# Patient Record
Sex: FEMALE | Race: WHITE | Employment: OTHER | URBAN - METROPOLITAN AREA
[De-identification: names, ages, dates, MRNs, and addresses within clinical notes are randomized per-mention and may not be internally consistent; named-entity substitution may affect disease eponyms.]

---

## 2019-09-25 ENCOUNTER — HOSPITAL ENCOUNTER (OUTPATIENT)
Age: 84
Discharge: HOME | End: 2019-09-27
Payer: MEDICARE

## 2019-09-25 ENCOUNTER — OFFICE VISIT (OUTPATIENT)
Dept: FAMILY MEDICINE CLINIC | Age: 84
End: 2019-09-25
Payer: MEDICARE

## 2019-09-25 VITALS
TEMPERATURE: 97.7 F | DIASTOLIC BLOOD PRESSURE: 64 MMHG | HEART RATE: 67 BPM | BODY MASS INDEX: 22.66 KG/M2 | RESPIRATION RATE: 16 BRPM | OXYGEN SATURATION: 98 % | WEIGHT: 141 LBS | HEIGHT: 66 IN | SYSTOLIC BLOOD PRESSURE: 130 MMHG

## 2019-09-25 DIAGNOSIS — E55.9 VITAMIN D DEFICIENCY: ICD-10-CM

## 2019-09-25 DIAGNOSIS — Z76.89 ENCOUNTER TO ESTABLISH CARE: Primary | ICD-10-CM

## 2019-09-25 DIAGNOSIS — E03.9 ACQUIRED HYPOTHYROIDISM: ICD-10-CM

## 2019-09-25 DIAGNOSIS — E11.9 TYPE 2 DIABETES MELLITUS WITHOUT COMPLICATION, WITHOUT LONG-TERM CURRENT USE OF INSULIN (HCC): ICD-10-CM

## 2019-09-25 LAB
ALBUMIN SERPL-MCNC: 4.1 G/DL (ref 3.5–5.2)
ALP BLD-CCNC: 87 U/L (ref 35–104)
ALT SERPL-CCNC: 6 U/L (ref 0–32)
ANION GAP SERPL CALCULATED.3IONS-SCNC: 20 MMOL/L (ref 7–16)
AST SERPL-CCNC: 20 U/L (ref 0–31)
BASOPHILS ABSOLUTE: 0.1 E9/L (ref 0–0.2)
BASOPHILS RELATIVE PERCENT: 1.1 % (ref 0–2)
BILIRUB SERPL-MCNC: 0.7 MG/DL (ref 0–1.2)
BUN BLDV-MCNC: 17 MG/DL (ref 8–23)
CALCIUM SERPL-MCNC: 9.4 MG/DL (ref 8.6–10.2)
CHLORIDE BLD-SCNC: 101 MMOL/L (ref 98–107)
CO2: 21 MMOL/L (ref 22–29)
CREAT SERPL-MCNC: 0.8 MG/DL (ref 0.5–1)
CREATININE URINE: 229 MG/DL (ref 29–226)
EOSINOPHILS ABSOLUTE: 0.13 E9/L (ref 0.05–0.5)
EOSINOPHILS RELATIVE PERCENT: 1.5 % (ref 0–6)
GFR AFRICAN AMERICAN: >60
GFR NON-AFRICAN AMERICAN: >60 ML/MIN/1.73
GLUCOSE BLD-MCNC: 90 MG/DL (ref 74–99)
HBA1C MFR BLD: 6.2 % (ref 4–5.6)
HCT VFR BLD CALC: 42.4 % (ref 34–48)
HEMOGLOBIN: 13.6 G/DL (ref 11.5–15.5)
IMMATURE GRANULOCYTES #: 0.02 E9/L
IMMATURE GRANULOCYTES %: 0.2 % (ref 0–5)
LYMPHOCYTES ABSOLUTE: 2.83 E9/L (ref 1.5–4)
LYMPHOCYTES RELATIVE PERCENT: 31.6 % (ref 20–42)
MCH RBC QN AUTO: 31.8 PG (ref 26–35)
MCHC RBC AUTO-ENTMCNC: 32.1 % (ref 32–34.5)
MCV RBC AUTO: 99.1 FL (ref 80–99.9)
MICROALBUMIN UR-MCNC: 88.2 MG/L
MICROALBUMIN/CREAT UR-RTO: 38.5 (ref 0–30)
MONOCYTES ABSOLUTE: 1.1 E9/L (ref 0.1–0.95)
MONOCYTES RELATIVE PERCENT: 12.3 % (ref 2–12)
NEUTROPHILS ABSOLUTE: 4.78 E9/L (ref 1.8–7.3)
NEUTROPHILS RELATIVE PERCENT: 53.3 % (ref 43–80)
PDW BLD-RTO: 12.3 FL (ref 11.5–15)
PLATELET # BLD: 259 E9/L (ref 130–450)
PMV BLD AUTO: 11 FL (ref 7–12)
POTASSIUM SERPL-SCNC: 4.8 MMOL/L (ref 3.5–5)
RBC # BLD: 4.28 E12/L (ref 3.5–5.5)
SODIUM BLD-SCNC: 142 MMOL/L (ref 132–146)
T4 FREE: 1.29 NG/DL (ref 0.93–1.7)
TOTAL PROTEIN: 8.2 G/DL (ref 6.4–8.3)
TSH SERPL DL<=0.05 MIU/L-ACNC: 3.28 UIU/ML (ref 0.27–4.2)
VITAMIN D 25-HYDROXY: 49 NG/ML (ref 30–100)
WBC # BLD: 9 E9/L (ref 4.5–11.5)

## 2019-09-25 PROCEDURE — 36415 COLL VENOUS BLD VENIPUNCTURE: CPT

## 2019-09-25 PROCEDURE — 1123F ACP DISCUSS/DSCN MKR DOCD: CPT | Performed by: FAMILY MEDICINE

## 2019-09-25 PROCEDURE — 84443 ASSAY THYROID STIM HORMONE: CPT

## 2019-09-25 PROCEDURE — 1036F TOBACCO NON-USER: CPT | Performed by: FAMILY MEDICINE

## 2019-09-25 PROCEDURE — 1090F PRES/ABSN URINE INCON ASSESS: CPT | Performed by: FAMILY MEDICINE

## 2019-09-25 PROCEDURE — 99203 OFFICE O/P NEW LOW 30 MIN: CPT | Performed by: FAMILY MEDICINE

## 2019-09-25 PROCEDURE — 85025 COMPLETE CBC W/AUTO DIFF WBC: CPT

## 2019-09-25 PROCEDURE — G8420 CALC BMI NORM PARAMETERS: HCPCS | Performed by: FAMILY MEDICINE

## 2019-09-25 PROCEDURE — 84439 ASSAY OF FREE THYROXINE: CPT

## 2019-09-25 PROCEDURE — 4040F PNEUMOC VAC/ADMIN/RCVD: CPT | Performed by: FAMILY MEDICINE

## 2019-09-25 PROCEDURE — 82570 ASSAY OF URINE CREATININE: CPT

## 2019-09-25 PROCEDURE — 82044 UR ALBUMIN SEMIQUANTITATIVE: CPT

## 2019-09-25 PROCEDURE — 80053 COMPREHEN METABOLIC PANEL: CPT

## 2019-09-25 PROCEDURE — 82306 VITAMIN D 25 HYDROXY: CPT

## 2019-09-25 PROCEDURE — 83036 HEMOGLOBIN GLYCOSYLATED A1C: CPT

## 2019-09-25 PROCEDURE — G8427 DOCREV CUR MEDS BY ELIG CLIN: HCPCS | Performed by: FAMILY MEDICINE

## 2019-09-25 RX ORDER — LIOTHYRONINE SODIUM 5 UG/1
5 TABLET ORAL DAILY
COMMUNITY
End: 2019-10-04

## 2019-09-25 SDOH — HEALTH STABILITY: MENTAL HEALTH: HOW OFTEN DO YOU HAVE A DRINK CONTAINING ALCOHOL?: NEVER

## 2019-09-25 ASSESSMENT — ENCOUNTER SYMPTOMS
WHEEZING: 0
SHORTNESS OF BREATH: 0
COUGH: 0
VOMITING: 0
CONSTIPATION: 0
ABDOMINAL PAIN: 0
BLOOD IN STOOL: 0
NAUSEA: 0
DIARRHEA: 0
BACK PAIN: 0

## 2019-09-25 ASSESSMENT — PATIENT HEALTH QUESTIONNAIRE - PHQ9
2. FEELING DOWN, DEPRESSED OR HOPELESS: 0
SUM OF ALL RESPONSES TO PHQ QUESTIONS 1-9: 0
1. LITTLE INTEREST OR PLEASURE IN DOING THINGS: 0
SUM OF ALL RESPONSES TO PHQ QUESTIONS 1-9: 0
SUM OF ALL RESPONSES TO PHQ9 QUESTIONS 1 & 2: 0

## 2019-10-01 ENCOUNTER — TELEPHONE (OUTPATIENT)
Dept: FAMILY MEDICINE CLINIC | Age: 84
End: 2019-10-01

## 2019-10-04 ENCOUNTER — OFFICE VISIT (OUTPATIENT)
Dept: FAMILY MEDICINE CLINIC | Age: 84
End: 2019-10-04
Payer: MEDICARE

## 2019-10-04 VITALS
BODY MASS INDEX: 22.82 KG/M2 | HEART RATE: 51 BPM | TEMPERATURE: 97.7 F | OXYGEN SATURATION: 97 % | WEIGHT: 142 LBS | SYSTOLIC BLOOD PRESSURE: 130 MMHG | DIASTOLIC BLOOD PRESSURE: 70 MMHG | HEIGHT: 66 IN

## 2019-10-04 DIAGNOSIS — Z86.39 HISTORY OF DIABETES MELLITUS: ICD-10-CM

## 2019-10-04 DIAGNOSIS — Z23 NEED FOR SHINGLES VACCINE: ICD-10-CM

## 2019-10-04 DIAGNOSIS — E55.9 VITAMIN D DEFICIENCY: Primary | ICD-10-CM

## 2019-10-04 DIAGNOSIS — Z23 NEED FOR PNEUMOCOCCAL VACCINATION: ICD-10-CM

## 2019-10-04 DIAGNOSIS — Z86.39 HISTORY OF HYPOTHYROIDISM: ICD-10-CM

## 2019-10-04 PROCEDURE — G8482 FLU IMMUNIZE ORDER/ADMIN: HCPCS | Performed by: FAMILY MEDICINE

## 2019-10-04 PROCEDURE — 90732 PPSV23 VACC 2 YRS+ SUBQ/IM: CPT | Performed by: FAMILY MEDICINE

## 2019-10-04 PROCEDURE — G0009 ADMIN PNEUMOCOCCAL VACCINE: HCPCS | Performed by: FAMILY MEDICINE

## 2019-10-04 PROCEDURE — 1123F ACP DISCUSS/DSCN MKR DOCD: CPT | Performed by: FAMILY MEDICINE

## 2019-10-04 PROCEDURE — 99213 OFFICE O/P EST LOW 20 MIN: CPT | Performed by: FAMILY MEDICINE

## 2019-10-04 PROCEDURE — G8420 CALC BMI NORM PARAMETERS: HCPCS | Performed by: FAMILY MEDICINE

## 2019-10-04 PROCEDURE — 1036F TOBACCO NON-USER: CPT | Performed by: FAMILY MEDICINE

## 2019-10-04 PROCEDURE — G8427 DOCREV CUR MEDS BY ELIG CLIN: HCPCS | Performed by: FAMILY MEDICINE

## 2019-10-04 PROCEDURE — 4040F PNEUMOC VAC/ADMIN/RCVD: CPT | Performed by: FAMILY MEDICINE

## 2019-10-04 PROCEDURE — 1090F PRES/ABSN URINE INCON ASSESS: CPT | Performed by: FAMILY MEDICINE

## 2019-10-04 ASSESSMENT — ENCOUNTER SYMPTOMS
DIARRHEA: 0
BACK PAIN: 0
COUGH: 0
VOMITING: 0
BLOOD IN STOOL: 0
ABDOMINAL PAIN: 0
SHORTNESS OF BREATH: 0
NAUSEA: 0
WHEEZING: 0
CONSTIPATION: 0

## 2020-04-07 ENCOUNTER — VIRTUAL VISIT (OUTPATIENT)
Dept: FAMILY MEDICINE CLINIC | Age: 85
End: 2020-04-07
Payer: MEDICARE

## 2020-04-07 PROBLEM — E55.9 VITAMIN D DEFICIENCY: Status: ACTIVE | Noted: 2020-04-07

## 2020-04-07 PROBLEM — Z86.39 HISTORY OF HYPOTHYROIDISM: Status: ACTIVE | Noted: 2020-04-07

## 2020-04-07 PROBLEM — E11.9 DIET-CONTROLLED DIABETES MELLITUS (HCC): Status: ACTIVE | Noted: 2020-04-07

## 2020-04-07 PROCEDURE — 99442 PR PHYS/QHP TELEPHONE EVALUATION 11-20 MIN: CPT | Performed by: FAMILY MEDICINE

## 2020-04-07 ASSESSMENT — ENCOUNTER SYMPTOMS
BACK PAIN: 0
DIARRHEA: 0
ABDOMINAL PAIN: 0
WHEEZING: 0
NAUSEA: 0
SHORTNESS OF BREATH: 0
VOMITING: 0
BLOOD IN STOOL: 0
CONSTIPATION: 0
COUGH: 0

## 2020-04-07 NOTE — PROGRESS NOTES
Lab Results   Component Value Date    TSH 3.280 09/25/2019    T4FREE 1.29 09/25/2019     Vitamin D deficiency  Current treatment: cholecalciferol 2000 units daily  Recent changes in medication: none  Lab Results   Component Value Date    VITD25 49 09/25/2019    VITD25 41 10/02/2017    VITD25 45 04/22/2016     Review of Systems   Constitutional: Negative for chills, fatigue, fever and unexpected weight change. Respiratory: Negative for cough, shortness of breath and wheezing. Cardiovascular: Negative for chest pain, palpitations and leg swelling. Gastrointestinal: Negative for abdominal pain, blood in stool, constipation, diarrhea, nausea and vomiting. Endocrine: Negative for polydipsia, polyphagia and polyuria. Genitourinary: Negative for difficulty urinating and dysuria. Musculoskeletal: Positive for arthralgias (right knee). Negative for back pain and neck pain. Neurological: Negative for weakness, numbness and headaches. Psychiatric/Behavioral: Negative for dysphoric mood. The patient is not nervous/anxious. Health Maintenance Due   Topic Date Due    Shingles Vaccine (1 of 2) 09/12/1981    Annual Wellness Visit (AWV)  08/04/2019     Shingles: discussed recommendation 10/4/19      Current Outpatient Medications   Medication Sig Dispense Refill    ascorbic acid (VITAMIN C) 500 MG tablet Take 500 mg by mouth daily.  Glucosamine-Chondroit-Vit C-Mn (GLUCOSAMINE CHONDR 1500 COMPLX PO) Take 1 tablet by mouth daily.  vitamin D (ERGOCALCIFEROL) 400 UNITS CAPS Take 1,000 Units by mouth daily.  aspirin 81 MG chewable tablet Take 81 mg by mouth daily.  multivitamin (THERAGRAN) per tablet Take 1 tablet by mouth daily. No current facility-administered medications for this visit.         History    Past Medical History:   Diagnosis Date    Arthritis     right knee    Diabetes mellitus ([facility] [address]     Hyperthyroidism        Past Surgical History:   Procedure Laterality not in acute distress. HENT:      Head: Normocephalic and atraumatic. Pulmonary:      Effort: No respiratory distress. Neurological:      Mental Status: She is alert and oriented to person, place, and time. Psychiatric:         Mood and Affect: Mood normal.         Speech: Speech normal.         Behavior: Behavior normal.         Thought Content: Thought content normal.         Judgment: Judgment normal.       ASSESSMENT AND PLAN    Labs were not done prior to todays visit. Patient reports she feels well in general and has no new complaints today. Will tentatively plan to have her get the lab work done one week prior to next visit in three months. 1. Diet-controlled diabetes mellitus ([facility] [address]  Clinically stable. Reassess relevant lab work in three months. 2. History of hypothyroidism  Clinically stable. Reassess relevant lab work in three months. 3. Vitamin D deficiency   Continue current treatment and obtain relevant lab work for review next visit. Return in about 3 months (around 7/7/2020) for hx thyroid dz, DM, vit d def, Labs are to be done one week prior to next visit. Trini Vasquez DO  04/07/20  2:48 PM    There are no Patient Instructions on file for this visit.

## 2021-02-04 ENCOUNTER — IMMUNIZATION (OUTPATIENT)
Dept: PRIMARY CARE CLINIC | Age: 86
End: 2021-02-04
Payer: MEDICARE

## 2021-02-04 PROCEDURE — 0011A COVID-19, MODERNA VACCINE 100MCG/0.5ML DOSE: CPT | Performed by: NURSE PRACTITIONER

## 2021-02-04 PROCEDURE — 91301 COVID-19, MODERNA VACCINE 100MCG/0.5ML DOSE: CPT | Performed by: NURSE PRACTITIONER

## 2021-03-04 ENCOUNTER — IMMUNIZATION (OUTPATIENT)
Dept: PRIMARY CARE CLINIC | Age: 86
End: 2021-03-04
Payer: MEDICARE

## 2021-03-04 PROCEDURE — 91301 COVID-19, MODERNA VACCINE 100MCG/0.5ML DOSE: CPT | Performed by: NURSE PRACTITIONER

## 2021-03-04 PROCEDURE — 0012A COVID-19, MODERNA VACCINE 100MCG/0.5ML DOSE: CPT | Performed by: NURSE PRACTITIONER

## 2022-08-02 ENCOUNTER — APPOINTMENT (OUTPATIENT)
Dept: CT IMAGING | Age: 87
DRG: 521 | End: 2022-08-02
Payer: MEDICARE

## 2022-08-02 ENCOUNTER — HOSPITAL ENCOUNTER (INPATIENT)
Age: 87
LOS: 7 days | Discharge: ANOTHER HOSPITAL | DRG: 521 | End: 2022-08-09
Attending: EMERGENCY MEDICINE | Admitting: INTERNAL MEDICINE
Payer: MEDICARE

## 2022-08-02 ENCOUNTER — APPOINTMENT (OUTPATIENT)
Dept: GENERAL RADIOLOGY | Age: 87
DRG: 521 | End: 2022-08-02
Payer: MEDICARE

## 2022-08-02 DIAGNOSIS — I10 ESSENTIAL HYPERTENSION: ICD-10-CM

## 2022-08-02 DIAGNOSIS — N39.0 URINARY TRACT INFECTION WITHOUT HEMATURIA, SITE UNSPECIFIED: ICD-10-CM

## 2022-08-02 DIAGNOSIS — S72.012A SUBCAPITAL FRACTURE OF NECK OF FEMUR, LEFT, CLOSED, INITIAL ENCOUNTER (HCC): ICD-10-CM

## 2022-08-02 DIAGNOSIS — Z87.81 S/P LEFT HIP FRACTURE: ICD-10-CM

## 2022-08-02 DIAGNOSIS — I48.91 NEW ONSET ATRIAL FIBRILLATION (HCC): Primary | ICD-10-CM

## 2022-08-02 PROBLEM — S72.90XA FEMUR FRACTURE (HCC): Status: ACTIVE | Noted: 2022-08-02

## 2022-08-02 LAB
ALBUMIN SERPL-MCNC: 4.2 G/DL (ref 3.5–5.2)
ALP BLD-CCNC: 108 U/L (ref 35–104)
ALT SERPL-CCNC: 5 U/L (ref 0–32)
ANION GAP SERPL CALCULATED.3IONS-SCNC: 15 MMOL/L (ref 7–16)
AST SERPL-CCNC: 15 U/L (ref 0–31)
BACTERIA: ABNORMAL /HPF
BASOPHILS ABSOLUTE: 0.09 E9/L (ref 0–0.2)
BASOPHILS RELATIVE PERCENT: 1.2 % (ref 0–2)
BILIRUB SERPL-MCNC: 1.3 MG/DL (ref 0–1.2)
BILIRUBIN URINE: NEGATIVE
BLOOD, URINE: NEGATIVE
BUN BLDV-MCNC: 20 MG/DL (ref 6–23)
CALCIUM SERPL-MCNC: 9.9 MG/DL (ref 8.6–10.2)
CHLORIDE BLD-SCNC: 103 MMOL/L (ref 98–107)
CLARITY: ABNORMAL
CO2: 25 MMOL/L (ref 22–29)
COLOR: YELLOW
CREAT SERPL-MCNC: 0.8 MG/DL (ref 0.5–1)
EKG ATRIAL RATE: 43 BPM
EKG ATRIAL RATE: 81 BPM
EKG Q-T INTERVAL: 430 MS
EKG Q-T INTERVAL: 460 MS
EKG QRS DURATION: 70 MS
EKG QRS DURATION: 70 MS
EKG QTC CALCULATION (BAZETT): 425 MS
EKG QTC CALCULATION (BAZETT): 474 MS
EKG R AXIS: -7 DEGREES
EKG R AXIS: 7 DEGREES
EKG T AXIS: -56 DEGREES
EKG T AXIS: 126 DEGREES
EKG VENTRICULAR RATE: 59 BPM
EKG VENTRICULAR RATE: 64 BPM
EOSINOPHILS ABSOLUTE: 0.03 E9/L (ref 0.05–0.5)
EOSINOPHILS RELATIVE PERCENT: 0.4 % (ref 0–6)
EPITHELIAL CELLS, UA: ABNORMAL /HPF
GFR AFRICAN AMERICAN: >60
GFR NON-AFRICAN AMERICAN: >60 ML/MIN/1.73
GLUCOSE BLD-MCNC: 94 MG/DL (ref 74–99)
GLUCOSE URINE: NEGATIVE MG/DL
HCT VFR BLD CALC: 40.9 % (ref 34–48)
HEMOGLOBIN: 14.1 G/DL (ref 11.5–15.5)
IMMATURE GRANULOCYTES #: 0.02 E9/L
IMMATURE GRANULOCYTES %: 0.3 % (ref 0–5)
KETONES, URINE: 15 MG/DL
LEUKOCYTE ESTERASE, URINE: ABNORMAL
LYMPHOCYTES ABSOLUTE: 1.75 E9/L (ref 1.5–4)
LYMPHOCYTES RELATIVE PERCENT: 23.1 % (ref 20–42)
MAGNESIUM: 1.9 MG/DL (ref 1.6–2.6)
MCH RBC QN AUTO: 32 PG (ref 26–35)
MCHC RBC AUTO-ENTMCNC: 34.5 % (ref 32–34.5)
MCV RBC AUTO: 93 FL (ref 80–99.9)
MONOCYTES ABSOLUTE: 0.9 E9/L (ref 0.1–0.95)
MONOCYTES RELATIVE PERCENT: 11.9 % (ref 2–12)
NEUTROPHILS ABSOLUTE: 4.78 E9/L (ref 1.8–7.3)
NEUTROPHILS RELATIVE PERCENT: 63.1 % (ref 43–80)
NITRITE, URINE: NEGATIVE
PDW BLD-RTO: 12.5 FL (ref 11.5–15)
PH UA: 6 (ref 5–9)
PLATELET # BLD: 242 E9/L (ref 130–450)
PMV BLD AUTO: 10.3 FL (ref 7–12)
POTASSIUM REFLEX MAGNESIUM: 3.5 MMOL/L (ref 3.5–5)
PRO-BNP: 1573 PG/ML (ref 0–450)
PROTEIN UA: NEGATIVE MG/DL
RBC # BLD: 4.4 E12/L (ref 3.5–5.5)
RBC UA: ABNORMAL /HPF (ref 0–2)
SODIUM BLD-SCNC: 143 MMOL/L (ref 132–146)
SPECIFIC GRAVITY UA: 1.01 (ref 1–1.03)
TOTAL PROTEIN: 7.9 G/DL (ref 6.4–8.3)
TROPONIN, HIGH SENSITIVITY: 33 NG/L (ref 0–9)
TROPONIN, HIGH SENSITIVITY: 34 NG/L (ref 0–9)
TROPONIN, HIGH SENSITIVITY: 34 NG/L (ref 0–9)
TROPONIN, HIGH SENSITIVITY: 35 NG/L (ref 0–9)
TSH SERPL DL<=0.05 MIU/L-ACNC: 3.43 UIU/ML (ref 0.27–4.2)
UROBILINOGEN, URINE: 2 E.U./DL
WBC # BLD: 7.6 E9/L (ref 4.5–11.5)
WBC UA: ABNORMAL /HPF (ref 0–5)

## 2022-08-02 PROCEDURE — 2580000003 HC RX 258: Performed by: STUDENT IN AN ORGANIZED HEALTH CARE EDUCATION/TRAINING PROGRAM

## 2022-08-02 PROCEDURE — 93005 ELECTROCARDIOGRAM TRACING: CPT | Performed by: EMERGENCY MEDICINE

## 2022-08-02 PROCEDURE — 83735 ASSAY OF MAGNESIUM: CPT

## 2022-08-02 PROCEDURE — 99285 EMERGENCY DEPT VISIT HI MDM: CPT

## 2022-08-02 PROCEDURE — 6360000002 HC RX W HCPCS: Performed by: STUDENT IN AN ORGANIZED HEALTH CARE EDUCATION/TRAINING PROGRAM

## 2022-08-02 PROCEDURE — 6360000002 HC RX W HCPCS: Performed by: EMERGENCY MEDICINE

## 2022-08-02 PROCEDURE — 96376 TX/PRO/DX INJ SAME DRUG ADON: CPT

## 2022-08-02 PROCEDURE — 84443 ASSAY THYROID STIM HORMONE: CPT

## 2022-08-02 PROCEDURE — 74176 CT ABD & PELVIS W/O CONTRAST: CPT

## 2022-08-02 PROCEDURE — 96374 THER/PROPH/DIAG INJ IV PUSH: CPT

## 2022-08-02 PROCEDURE — 36415 COLL VENOUS BLD VENIPUNCTURE: CPT

## 2022-08-02 PROCEDURE — 1200000000 HC SEMI PRIVATE

## 2022-08-02 PROCEDURE — 73502 X-RAY EXAM HIP UNI 2-3 VIEWS: CPT

## 2022-08-02 PROCEDURE — 80053 COMPREHEN METABOLIC PANEL: CPT

## 2022-08-02 PROCEDURE — 81001 URINALYSIS AUTO W/SCOPE: CPT

## 2022-08-02 PROCEDURE — 71045 X-RAY EXAM CHEST 1 VIEW: CPT

## 2022-08-02 PROCEDURE — 83880 ASSAY OF NATRIURETIC PEPTIDE: CPT

## 2022-08-02 PROCEDURE — 85025 COMPLETE CBC W/AUTO DIFF WBC: CPT

## 2022-08-02 PROCEDURE — 87088 URINE BACTERIA CULTURE: CPT

## 2022-08-02 PROCEDURE — 84484 ASSAY OF TROPONIN QUANT: CPT

## 2022-08-02 PROCEDURE — 6360000002 HC RX W HCPCS: Performed by: INTERNAL MEDICINE

## 2022-08-02 PROCEDURE — 93005 ELECTROCARDIOGRAM TRACING: CPT | Performed by: STUDENT IN AN ORGANIZED HEALTH CARE EDUCATION/TRAINING PROGRAM

## 2022-08-02 PROCEDURE — 96375 TX/PRO/DX INJ NEW DRUG ADDON: CPT

## 2022-08-02 RX ORDER — MIDAZOLAM HYDROCHLORIDE 1 MG/ML
0.5 INJECTION INTRAMUSCULAR; INTRAVENOUS ONCE
Status: COMPLETED | OUTPATIENT
Start: 2022-08-02 | End: 2022-08-02

## 2022-08-02 RX ORDER — ENOXAPARIN SODIUM 100 MG/ML
40 INJECTION SUBCUTANEOUS DAILY
Status: DISCONTINUED | OUTPATIENT
Start: 2022-08-05 | End: 2022-08-05

## 2022-08-02 RX ORDER — HYDROCODONE BITARTRATE AND ACETAMINOPHEN 5; 325 MG/1; MG/1
1 TABLET ORAL EVERY 6 HOURS PRN
Status: DISCONTINUED | OUTPATIENT
Start: 2022-08-02 | End: 2022-08-09 | Stop reason: HOSPADM

## 2022-08-02 RX ORDER — HYDRALAZINE HYDROCHLORIDE 20 MG/ML
10 INJECTION INTRAMUSCULAR; INTRAVENOUS ONCE
Status: COMPLETED | OUTPATIENT
Start: 2022-08-02 | End: 2022-08-02

## 2022-08-02 RX ORDER — MORPHINE SULFATE 4 MG/ML
4 INJECTION, SOLUTION INTRAMUSCULAR; INTRAVENOUS ONCE
Status: COMPLETED | OUTPATIENT
Start: 2022-08-02 | End: 2022-08-02

## 2022-08-02 RX ORDER — MORPHINE SULFATE 4 MG/ML
4 INJECTION, SOLUTION INTRAMUSCULAR; INTRAVENOUS
Status: DISCONTINUED | OUTPATIENT
Start: 2022-08-02 | End: 2022-08-09 | Stop reason: HOSPADM

## 2022-08-02 RX ORDER — SODIUM CHLORIDE AND POTASSIUM CHLORIDE .9; .15 G/100ML; G/100ML
SOLUTION INTRAVENOUS CONTINUOUS
Status: DISCONTINUED | OUTPATIENT
Start: 2022-08-02 | End: 2022-08-06

## 2022-08-02 RX ORDER — LABETALOL HYDROCHLORIDE 5 MG/ML
10 INJECTION, SOLUTION INTRAVENOUS ONCE
Status: DISCONTINUED | OUTPATIENT
Start: 2022-08-02 | End: 2022-08-02

## 2022-08-02 RX ORDER — MORPHINE SULFATE 2 MG/ML
2 INJECTION, SOLUTION INTRAMUSCULAR; INTRAVENOUS
Status: DISCONTINUED | OUTPATIENT
Start: 2022-08-02 | End: 2022-08-09 | Stop reason: HOSPADM

## 2022-08-02 RX ORDER — ONDANSETRON 2 MG/ML
4 INJECTION INTRAMUSCULAR; INTRAVENOUS EVERY 6 HOURS PRN
Status: DISCONTINUED | OUTPATIENT
Start: 2022-08-02 | End: 2022-08-09 | Stop reason: HOSPADM

## 2022-08-02 RX ADMIN — MIDAZOLAM 0.5 MG: 1 INJECTION INTRAMUSCULAR; INTRAVENOUS at 16:52

## 2022-08-02 RX ADMIN — POTASSIUM CHLORIDE AND SODIUM CHLORIDE: 900; 150 INJECTION, SOLUTION INTRAVENOUS at 22:10

## 2022-08-02 RX ADMIN — HYDRALAZINE HYDROCHLORIDE 10 MG: 20 INJECTION INTRAMUSCULAR; INTRAVENOUS at 18:31

## 2022-08-02 RX ADMIN — CEFTRIAXONE SODIUM 1000 MG: 1 INJECTION, POWDER, FOR SOLUTION INTRAMUSCULAR; INTRAVENOUS at 18:11

## 2022-08-02 RX ADMIN — HYDRALAZINE HYDROCHLORIDE 10 MG: 20 INJECTION INTRAMUSCULAR; INTRAVENOUS at 20:05

## 2022-08-02 RX ADMIN — MORPHINE SULFATE 4 MG: 4 INJECTION, SOLUTION INTRAMUSCULAR; INTRAVENOUS at 16:00

## 2022-08-02 ASSESSMENT — LIFESTYLE VARIABLES: HOW OFTEN DO YOU HAVE A DRINK CONTAINING ALCOHOL: NEVER

## 2022-08-02 ASSESSMENT — PAIN DESCRIPTION - ORIENTATION
ORIENTATION: LEFT

## 2022-08-02 ASSESSMENT — ENCOUNTER SYMPTOMS
BACK PAIN: 0
SORE THROAT: 0
ABDOMINAL PAIN: 0
SHORTNESS OF BREATH: 0
DIARRHEA: 0
RHINORRHEA: 0
COUGH: 0
VOMITING: 0
NAUSEA: 0

## 2022-08-02 ASSESSMENT — PAIN - FUNCTIONAL ASSESSMENT
PAIN_FUNCTIONAL_ASSESSMENT: 0-10
PAIN_FUNCTIONAL_ASSESSMENT: NONE - DENIES PAIN

## 2022-08-02 ASSESSMENT — PAIN DESCRIPTION - DESCRIPTORS
DESCRIPTORS: DISCOMFORT
DESCRIPTORS: DISCOMFORT

## 2022-08-02 ASSESSMENT — PAIN SCALES - GENERAL
PAINLEVEL_OUTOF10: 10
PAINLEVEL_OUTOF10: 6
PAINLEVEL_OUTOF10: 5

## 2022-08-02 ASSESSMENT — PAIN DESCRIPTION - LOCATION
LOCATION: HIP

## 2022-08-02 NOTE — LETTER
[place] Department Medicaid  CERTIFICATION OF NECESSITY  FOR NON-EMERGENCY TRANSPORTATION   BY GROUND AMBULANCE      Individual Information   1. Name: Jovany Yi 2. [place] Medicaid Billing Number:    3. Address: [place] [address]      Transportation Provider Information   4. Provider Name: [facility]   5. [place] Medicaid Provider Number:  National Provider Identifier (NPI):      Certification  7. Criteria:  During transport, this individual requires:  [x] Medical treatment or continuous     supervision by an EMT. [] The administration or regulation of oxygen by another person. [] Supervised protective restraint. 8. Period Beginning Date: 8/9/22   9. Length  [x] Not more than 5 day(s)  [] One Year     Additional Information Relevant to Certification   10. Comments or Explanations, If Necessary or Appropriate     Left Subcapital fracture of neck of femur, S/P Cementless left hip replacement, Max assist, COVID Positive     Certifying Practitioner Information   11. Name of Practitioner: Dr Jenise Bell   12. [place] Medicaid Provider Number, If Applicable:  [id number] Provider Identifier (NPI):      Signature Information   14. Date of Signature: 8/5/22 15. Name of Person Signing: Ania Hopkins    12. Signature and Professional Designation: Electronically signed by BUBBA Simmons on 8/5/2022 at 3:12 PM       OD 78786  Rev. 7/2015  [facility] Encounter Date/Time: 8/2/2022 Estevan Suresh [address] Account: [de-identified]    MRN: [id number]    Patient: Jovany Yi    Contact Serial #: [id number]      ENCOUNTER          Patient Class: I Private Enc?   No Unit RM BD: Chava Padilla Service: MED   Encounter DX: Essential hypertension [*   ADM Provider: Sage Ma DO   Procedure:     ATT Provider: Sage Ma DO   REF Provider:        Admission DX: Essential hypertension, New onset atrial fibrillation ([facility] [address] Subcapital fracture of neck of femur, left, closed, initial encounter [facility]), Urinary tract infection without hematuria, site unspecified and DX codes: I10, I48.91, S72.012A, N39.0      PATIENT                 Name: Will Tolentino : 1931 (89 yrs)   Address: [address] [place] [address] Sex: Female   City: [address] [place] [address]         Marital Status:    Employer: RETIRED         [employer]: [employer]   Primary Care Provider: Na Chatterjee DO         Primary Phone: [phone]   EMERGENCY CONTACT   Contact Name Legal Guardian? Relationship to Patient Home Phone Work Phone   1. Nubia Becker  2. Orlando Liu      Child  Child [phone]              GUARANTOR            Guarantor: Will Tolentino     : 1931   Address: [address] [place] [address] Sex: Female     [place] [address]     Relation to Patient: Self       Home Phone: [phone]   Guarantor ID: [id number]       Work Phone:     Guarantor Employer: RETIRED         Status: RETIRED      COVERAGE        PRIMARY INSURANCE   Payor: MEDICARE Plan: MEDICARE PART A AND B   Payor Address: [facility] [address],  [place] [address], [facility] [address]       Group Number:   Insurance Type: INDEMNITY   Subscriber Name: Julien Lauren : 1931   Subscriber ID: [id number] Pat. Rel. to Sub: Self   SECONDARY INSURANCE   Payor: [employer] Plan: [employer] Address: [facility] [id number], [place], [place] [address]          Group Number: [id number] Insurance Type: INDEMNITY   Subscriber Name: Julien Lauren : 1931   Subscriber ID: [id number] Pat.  Rel. to Sub: SELF

## 2022-08-02 NOTE — ED PROVIDER NOTES
[address]   ED Provider Note  Department of Emergency Medicine     ED Room:       Written by: Ara Coyne DO  Patient Name: Gustavo Adame  Attending Provider: Augustus Schlatter, DO  Admit Date: 2022  2:19 PM  MRN: [id number]    : 1931        Chief Complaint   Patient presents with    Hip Pain     To er via ems from home for evaluation of left hip pain \"for quite a while\"\", but got worse this am getting OOB. Denies injury. - Chief complaint    HPI   Gustavo Adame is a 80 y.o. female presenting to the ED for evaluation of Hip Pain (To er via ems from home for evaluation of left hip pain \"for quite a while\"\", but got worse this am getting OOB. Denies injury. )      History obtained from patient and family. Patient has a past medical history of hypothyroidism; denies any history of hypertension but has not seen a doctor \"in years. \"  Patient lives at home by herself and her daughter and son both go to check on her often; they are concerned because she has been having decreased p.o. intake recently and has been having a harder time getting around because she has had significant left-sided hip pain for the past 3 weeks. Patient agrees with this but does somewhat try to downplay her decreased p.o. intake lately. She does state that her left hip/groin region is very painful and it is worse when she tries to get up and walk around. It is also worse with bearing weight. She denies any numbness anywhere. Denies any falls or injuries. She denies any history of injuries in the past and has never seen orthopedic surgeon before. When questioned further patient does state that she thinks at the beginning of all this she felt an initial pop/snap on her left hip region when she was bending over. She did not have any falls or trauma. She has not seen blood thinning medications.   Patient states that she takes some supplements and vitamin D but otherwise no daily medications. Patient's complaints are severe in severity, and persistent. Nothing in particular makes it better, walking and bearing weight makes it worse. Of note, patient also admits to increased urinary frequency recently. Denies any dysuria, hematuria, or incontinence. Denies any fevers, neck pain or stiffness, cough or sore throat, chest pain or palpitations, shortness of breath, abdominal pain, nausea or vomiting, diarrhea, black or bloody stools, numbness or tingling anywhere, lower extremity edema. Review of Systems   Constitutional:  Negative for chills and fever. HENT:  Negative for rhinorrhea and sore throat. Eyes:  Negative for visual disturbance. Respiratory:  Negative for cough and shortness of breath. Cardiovascular:  Negative for chest pain and palpitations. Gastrointestinal:  Negative for abdominal pain, diarrhea, nausea and vomiting. Genitourinary:  Positive for frequency. Negative for dysuria. Musculoskeletal:  Negative for back pain and myalgias. Left hip pain     Skin:  Negative for rash and wound. Neurological:  Negative for weakness, numbness and headaches. Psychiatric/Behavioral:  Negative for confusion. All other systems reviewed and are negative. Physical Exam  Vitals and nursing note reviewed. Constitutional:       General: She is not in acute distress. Appearance: She is not toxic-appearing. HENT:      Head: Normocephalic and atraumatic. Right Ear: External ear normal.      Left Ear: External ear normal.      Nose: Nose normal. No rhinorrhea. Mouth/Throat:      Mouth: Mucous membranes are moist.      Pharynx: Oropharynx is clear. Eyes:      Extraocular Movements: Extraocular movements intact. Conjunctiva/sclera: Conjunctivae normal.      Pupils: Pupils are equal, round, and reactive to light. Cardiovascular:      Rate and Rhythm: Bradycardia present. Rhythm irregular. Pulses: Normal pulses.       Heart sounds: Normal heart sounds. Pulmonary:      Effort: Pulmonary effort is normal. No respiratory distress. Breath sounds: Normal breath sounds. No wheezing or rales. Abdominal:      General: Bowel sounds are normal.      Palpations: Abdomen is soft. Tenderness: There is abdominal tenderness (left lower groin/hip region; no rebound or guarding). There is no guarding. Musculoskeletal:      Cervical back: Normal range of motion and neck supple. Right lower leg: No edema. Left lower leg: No edema. Comments: ROM left hip/left lower extremity limited 2/2 pain   Skin:     General: Skin is warm and dry. Capillary Refill: Capillary refill takes less than 2 seconds. Coloration: Skin is not jaundiced or pale. Findings: No rash. Comments: No overlying skin changes anywhere, specifically the left groin; no evidence to suggest Star's, no redness, warmth, open wounds, or crepitus. No bruising or outward signs of injury. Neurological:      General: No focal deficit present. Mental Status: She is alert and oriented to person, place, and time. Sensory: No sensory deficit. Motor: No weakness. Psychiatric:         Mood and Affect: Mood normal.         Behavior: Behavior normal.        Procedures       [facility]       ED Course as of 08/02/22 2104 Tue Aug 02, 2022   2027 EKG #2 @ 2255: This EKG is signed and interpreted by me. Rate: 51  Rhythm: Atrial fibrillation  Interpretation: A. fib with controlled ventricular response, QRS is 100, QTc is 464, nonspecific ST changes, more clearly A. fib and compared EKG #1  Comparison: stable as compared to patient's most recent EKG   [ME]      ED Course User Index  [ME] Merry Garcia DO         Medical Decision Making: This is a 80 y.o. female presenting for evaluation of left hip/groin region pain x2-3 weeks; recent increased urinary frequency; recent decreased PO intake.    On arrival patient is alert, oriented, no acute distress. Vitals are notable for significant HTN, SBP in the 240s-250s; patient denies history of HTN. Intermittent bradycardia in the 50s with irregular rate. Patient denies history of atrial fibrillation. Labs and imaging ordered. Patient seen and examined. Exam is notable for TTP to left groin/hip region. Exam as noted above. ROM left hip limited 2/2 pain. No outward signs of injury, no obvious deformities. Labs reviewed, significant for UTI; treated with rocephin. Troponin is 34 x2. Imaging reviewed, CT abd/pelvis shows left subcapital fracture of neck of femur. Treatment included morphine for pain and small dose versed for anxiety; this improved patient's symptoms but did not significantly improve her HTN. She was given hydralazine with marked improvement. Additionally, patient was noted to be in atrial fibrillation on her EKG, with rate in the 50s-60s; she denies known history of this. Unclear how long she has been in this rhythm. Denies any associated symptoms. Decision made to admit this patient for further evaluation and management of the above findings. She is amenable. [facility]. See ED COURSE for additional MDM. Labs & imaging were reviewed and interpreted, see RESULTS. I have personally reviewed all laboratory and imaging results for this patient. I have discussed this patient with my attending, who has seen the patient and agrees with this disposition. Patient was seen and evaluated by myself and my attending Merry Garcia DO. Assessment and Plan discussed with attending provider, please see attestation for final plan of care.           --------------------------------------------- PAST HISTORY ---------------------------------------------  Past Medical History:  has a past medical history of Arthritis, Diabetes mellitus ([facility] [address] and Hyperthyroidism.     Past Surgical History:  has a past surgical Calcium 9.9 8.6 - 10.2 mg/dL    Total Protein 7.9 6.4 - 8.3 g/dL    Albumin 4.2 3.5 - 5.2 g/dL    Total Bilirubin 1.3 (H) 0.0 - 1.2 mg/dL    Alkaline Phosphatase 108 (H) 35 - 104 U/L    ALT 5 0 - 32 U/L    AST 15 0 - 31 U/L   Troponin   Result Value Ref Range    Troponin, High Sensitivity 34 (H) 0 - 9 ng/L   Brain Natriuretic Peptide   Result Value Ref Range    Pro-BNP 1,573 (H) 0 - 450 pg/mL   Urinalysis with Microscopic   Result Value Ref Range    Color, UA Yellow Straw/Yellow    Clarity, UA SLCLOUDY Clear    Glucose, Ur Negative Negative mg/dL    Bilirubin Urine Negative Negative    Ketones, Urine 15 (A) Negative mg/dL    Specific Gravity, UA 1.015 1.005 - 1.030    Blood, Urine Negative Negative    pH, UA 6.0 5.0 - 9.0    Protein, UA Negative Negative mg/dL    Urobilinogen, Urine 2.0 (A) <2.0 E.U./dL    Nitrite, Urine Negative Negative    Leukocyte Esterase, Urine MODERATE (A) Negative    WBC, UA 10-20 (A) 0 - 5 /HPF    RBC, UA 0-1 0 - 2 /HPF    Epithelial Cells, UA FEW /HPF    Bacteria, UA RARE (A) None Seen /HPF   Magnesium   Result Value Ref Range    Magnesium 1.9 1.6 - 2.6 mg/dL   Troponin   Result Value Ref Range    Troponin, High Sensitivity 34 (H) 0 - 9 ng/L   TSH   Result Value Ref Range    TSH 3.430 0.270 - 4.200 uIU/mL   EKG 12 Lead   Result Value Ref Range    Ventricular Rate 59 BPM    Atrial Rate 43 BPM    QRS Duration 70 ms    Q-T Interval 430 ms    QTc Calculation (Bazett) 425 ms    R Axis 7 degrees    T Axis 126 degrees   EKG 12 Lead   Result Value Ref Range    Ventricular Rate 64 BPM    Atrial Rate 81 BPM    QRS Duration 70 ms    Q-T Interval 460 ms    QTc Calculation (Bazett) 474 ms    R Axis -7 degrees    T Axis -56 degrees   EKG 12 Lead   Result Value Ref Range    Ventricular Rate 51 BPM    Atrial Rate 48 BPM    QRS Duration 100 ms    Q-T Interval 504 ms    QTc Calculation (Bazett) 464 ms    R Axis -17 degrees    T Axis 76 degrees       RADIOLOGY:  XR CHEST 1 VIEW   Final Result   No acute process. XR HIP LEFT (2-3 VIEWS)   Final Result   Acute subcapital left femoral neck fracture. CT ABDOMEN PELVIS WO CONTRAST Additional Contrast? None   Final Result   Impacted angulated SUBCAPITAL FRACTURE OF THE LEFT FEMUR . Surgical   evaluation recommended. Diverticulosis of colon with constipation. Interpreted by the radiologist unless otherwise specified.      ------------------------- NURSING NOTES AND VITALS REVIEWED ---------------------------  Date / Time Roomed:  8/2/2022  2:19 PM  ED Bed Assignment:  1505/1695-98    The nursing notes within the ED encounter and vital signs as below have been reviewed by myself. Patient Vitals for the past 24 hrs:   BP Temp Temp src Pulse Resp SpO2 Height Weight   08/02/22 2014 (!) 178/82 -- -- -- -- -- -- --   08/02/22 2004 (!) 213/78 -- -- 63 25 98 % -- --   08/02/22 1920 (!) 204/86 -- -- 59 -- -- -- --   08/02/22 1852 (!) 222/81 -- -- 56 17 96 % -- --   08/02/22 1831 (!) 245/87 -- -- -- -- -- -- --   08/02/22 1803 (!) 257/96 -- -- 57 18 98 % -- --   08/02/22 1637 (!) 244/92 -- -- 52 22 93 % -- --   08/02/22 1603 (!) 245/82 -- -- 61 22 98 % -- --   08/02/22 1600 -- -- -- -- 23 -- -- --   08/02/22 1509 (!) 259/91 -- -- -- -- -- -- --   08/02/22 1455 (!) 249/87 97.7 °F (36.5 °C) Oral 62 20 99 % 5' 5\" (1.651 m) 115 lb (52.2 kg)       Oxygen Saturation Interpretation: Normal    The patients available past medical records and past encounters were reviewed. ------------------------------------------ PROGRESS NOTES ------------------------------------------  Re-evaluation(s):  Please see ED course    Counseling:  I have spoken with the patient and her daughter  and discussed todays results, in addition to providing specific details for the plan of care and counseling regarding the diagnosis and prognosis.   Their questions are answered at this time and they are agreeable with the plan of

## 2022-08-03 PROBLEM — I48.91 NEW ONSET ATRIAL FIBRILLATION (HCC): Status: ACTIVE | Noted: 2022-08-03

## 2022-08-03 LAB
CHOLESTEROL, TOTAL: 162 MG/DL (ref 0–199)
EKG ATRIAL RATE: 48 BPM
EKG Q-T INTERVAL: 504 MS
EKG QRS DURATION: 100 MS
EKG QTC CALCULATION (BAZETT): 464 MS
EKG R AXIS: -17 DEGREES
EKG T AXIS: 76 DEGREES
EKG VENTRICULAR RATE: 51 BPM
HDLC SERPL-MCNC: 78 MG/DL
LDL CHOLESTEROL CALCULATED: 70 MG/DL (ref 0–99)
LV EF: 75 %
LVEF MODALITY: NORMAL
T4 FREE: 1.3 NG/DL (ref 0.93–1.7)
TRIGL SERPL-MCNC: 70 MG/DL (ref 0–149)
TSH SERPL DL<=0.05 MIU/L-ACNC: 3.14 UIU/ML (ref 0.27–4.2)
VLDLC SERPL CALC-MCNC: 14 MG/DL

## 2022-08-03 PROCEDURE — 94150 VITAL CAPACITY TEST: CPT

## 2022-08-03 PROCEDURE — 2580000003 HC RX 258: Performed by: NURSE PRACTITIONER

## 2022-08-03 PROCEDURE — 6370000000 HC RX 637 (ALT 250 FOR IP): Performed by: INTERNAL MEDICINE

## 2022-08-03 PROCEDURE — 84443 ASSAY THYROID STIM HORMONE: CPT

## 2022-08-03 PROCEDURE — 6360000002 HC RX W HCPCS: Performed by: NURSE PRACTITIONER

## 2022-08-03 PROCEDURE — 99223 1ST HOSP IP/OBS HIGH 75: CPT | Performed by: INTERNAL MEDICINE

## 2022-08-03 PROCEDURE — 80061 LIPID PANEL: CPT

## 2022-08-03 PROCEDURE — 93005 ELECTROCARDIOGRAM TRACING: CPT | Performed by: INTERNAL MEDICINE

## 2022-08-03 PROCEDURE — 93306 TTE W/DOPPLER COMPLETE: CPT

## 2022-08-03 PROCEDURE — 6360000002 HC RX W HCPCS: Performed by: EMERGENCY MEDICINE

## 2022-08-03 PROCEDURE — 1200000000 HC SEMI PRIVATE

## 2022-08-03 PROCEDURE — 36415 COLL VENOUS BLD VENIPUNCTURE: CPT

## 2022-08-03 PROCEDURE — APPSS60 APP SPLIT SHARED TIME 46-60 MINUTES: Performed by: PHYSICIAN ASSISTANT

## 2022-08-03 PROCEDURE — 6360000002 HC RX W HCPCS: Performed by: INTERNAL MEDICINE

## 2022-08-03 PROCEDURE — 84439 ASSAY OF FREE THYROXINE: CPT

## 2022-08-03 RX ORDER — HYDRALAZINE HYDROCHLORIDE 20 MG/ML
5 INJECTION INTRAMUSCULAR; INTRAVENOUS EVERY 4 HOURS PRN
Status: DISCONTINUED | OUTPATIENT
Start: 2022-08-03 | End: 2022-08-09 | Stop reason: HOSPADM

## 2022-08-03 RX ORDER — AMLODIPINE BESYLATE 5 MG/1
5 TABLET ORAL DAILY
Status: DISCONTINUED | OUTPATIENT
Start: 2022-08-03 | End: 2022-08-09 | Stop reason: HOSPADM

## 2022-08-03 RX ORDER — LISINOPRIL 20 MG/1
40 TABLET ORAL DAILY
Status: DISCONTINUED | OUTPATIENT
Start: 2022-08-03 | End: 2022-08-09 | Stop reason: HOSPADM

## 2022-08-03 RX ORDER — METOPROLOL SUCCINATE 25 MG/1
25 TABLET, EXTENDED RELEASE ORAL DAILY
Status: DISCONTINUED | OUTPATIENT
Start: 2022-08-03 | End: 2022-08-03

## 2022-08-03 RX ADMIN — MORPHINE SULFATE 4 MG: 4 INJECTION, SOLUTION INTRAMUSCULAR; INTRAVENOUS at 03:19

## 2022-08-03 RX ADMIN — AMLODIPINE BESYLATE 5 MG: 5 TABLET ORAL at 21:20

## 2022-08-03 RX ADMIN — HYDRALAZINE HYDROCHLORIDE 5 MG: 20 INJECTION INTRAMUSCULAR; INTRAVENOUS at 08:50

## 2022-08-03 RX ADMIN — LISINOPRIL 40 MG: 20 TABLET ORAL at 14:32

## 2022-08-03 RX ADMIN — POTASSIUM CHLORIDE AND SODIUM CHLORIDE: 900; 150 INJECTION, SOLUTION INTRAVENOUS at 14:32

## 2022-08-03 RX ADMIN — METOPROLOL SUCCINATE 25 MG: 25 TABLET, EXTENDED RELEASE ORAL at 14:32

## 2022-08-03 RX ADMIN — CEFTRIAXONE SODIUM 1000 MG: 1 INJECTION, POWDER, FOR SOLUTION INTRAMUSCULAR; INTRAVENOUS at 08:50

## 2022-08-03 RX ADMIN — HYDRALAZINE HYDROCHLORIDE 5 MG: 20 INJECTION INTRAMUSCULAR; INTRAVENOUS at 16:49

## 2022-08-03 RX ADMIN — ONDANSETRON 4 MG: 2 INJECTION INTRAMUSCULAR; INTRAVENOUS at 08:55

## 2022-08-03 ASSESSMENT — PAIN SCALES - GENERAL
PAINLEVEL_OUTOF10: 5
PAINLEVEL_OUTOF10: 8

## 2022-08-03 ASSESSMENT — PAIN DESCRIPTION - DESCRIPTORS
DESCRIPTORS: ACHING;SHARP;STABBING
DESCRIPTORS: ACHING

## 2022-08-03 ASSESSMENT — PAIN DESCRIPTION - LOCATION
LOCATION: BACK;CHEST
LOCATION: HIP

## 2022-08-03 ASSESSMENT — PAIN DESCRIPTION - ORIENTATION: ORIENTATION: LEFT

## 2022-08-03 NOTE — PROGRESS NOTES
Consult received, full consult note to follow. Patient presenting with left hip fracture. She will need cardiology consult, will likely plan on OR on 8/4, Thursday.   Full consult note to follow

## 2022-08-03 NOTE — H&P
Department of Internal Medicine  History and Physical    PCP: Dr. Cinda Leon   Admitting Physician: Dr. Angel Christine  Consultants: Dr. Nicolas Cox, Dr. Zaki Kaminski:  Left hip pain    HISTORY OF PRESENT ILLNESS:    Radha Lewis is a polite and pleasant 80-year-old female who presented to [address] [place] [address] emergency department for the evaluation of left-sided hip pain. She denies any inciting event and refutes any fall history. Work-up in the emergency department revealed an acute subcapital left femoral neck fracture. While in the emergency department, she was also found to be in new onset atrial fibrillation but with rate control. She has no previous history of cardiovascular disease. She is on few medications at home. She denies active chest pain or shortness of breath at this time. She is a somewhat poor historian overall and no family members were present during my examination    PAST MEDICAL Hx:  Past Medical History:   Diagnosis Date    Arthritis     right knee    Diabetes mellitus ([address]     Hyperthyroidism        PAST SURGICAL Hx:   Past Surgical History:   Procedure Laterality Date    CATARACT REMOVAL WITH IMPLANT  2008    KNEE ARTHROSCOPY  2005    right    TONSILLECTOMY         FAMILY Hx:  Family History   Problem Relation Age of Onset    Heart Failure Mother     Heart Failure Father        HOME MEDICATIONS:  Prior to Admission medications    Medication Sig Start Date End Date Taking? Authorizing Provider   ascorbic acid (VITAMIN C) 500 MG tablet Take 500 mg by mouth daily. Patient not taking: Reported on 8/2/2022    Historical Provider, MD   Glucosamine-Chondroit-Vit C-Mn (GLUCOSAMINE CHONDR 1500 COMPLX PO) Take 1 tablet by mouth daily. Patient not taking: Reported on 8/2/2022    Historical Provider, MD   vitamin D (ERGOCALCIFEROL) 400 UNITS CAPS Take 1,000 Units by mouth daily. Historical Provider, MD   aspirin 81 MG chewable tablet Take 81 mg by mouth daily.     Patient not taking: Reported on 8/2/2022    Historical Provider, MD   multivitamin [facility]) per tablet Take 1 tablet by mouth daily. Historical Provider, MD       ALLERGIES:  Raquel Manner sulfadiazine]    SOCIAL Hx:  Social History     Socioeconomic History    Marital status:      Spouse name: Not on file    Number of children: Not on file    Years of education: Not on file    Highest education level: Not on file   Occupational History    Not on file   Tobacco Use    Smoking status: Never    Smokeless tobacco: Never   Substance and Sexual Activity    Alcohol use: Yes     Comment: rarely    Drug use: No    Sexual activity: Not on file   Other Topics Concern    Not on file   Social History Narrative    Not on file     Social Determinants of Health     Financial Resource Strain: Not on file   Food Insecurity: Not on file   Transportation Needs: Not on file   Physical Activity: Not on file   Stress: Not on file   Social Connections: Not on file   Intimate Partner Violence: Not on file   Housing Stability: Not on file       ROS:  General:   Denies chills, fatigue, fever, malaise, night sweats or weight loss    Psychological:   Denies anxiety, disorientation or hallucinations    ENT:    Denies epistaxis, headaches, vertigo or visual changes    Cardiovascular:   Atrial fibrillation with current rate control. Mild systolic murmur. Respiratory:   Denies shortness of breath, coughing, sputum production, hemoptysis, or wheezing. No orthopnea. Gastrointestinal:   Denies nausea, vomiting, diarrhea, or constipation. Denies any abdominal pain. Denies change in bowel habits or stools. Genito-Urinary:    Denies any urgency, frequency, hematuria. Voiding without difficulty. Musculoskeletal:   Admits to diffuse muscle weakness. Neurology:    Denies any headache or focal neurological deficits. No weakness or paresthesia. Derm:    Denies any rashes, ulcers, or excoriations. Denies bruising.       Extremities: PM    HGB 14.1 08/02/2022 03:48 PM    HCT 40.9 08/02/2022 03:48 PM     08/02/2022 03:48 PM    MCV 93.0 08/02/2022 03:48 PM    MCH 32.0 08/02/2022 03:48 PM    MCHC 34.5 08/02/2022 03:48 PM    RDW 12.5 08/02/2022 03:48 PM    SEGSPCT 49 11/25/2013 02:50 PM    LYMPHOPCT 23.1 08/02/2022 03:48 PM    MONOPCT 11.9 08/02/2022 03:48 PM    BASOPCT 1.2 08/02/2022 03:48 PM    MONOSABS 0.90 08/02/2022 03:48 PM    LYMPHSABS 1.75 08/02/2022 03:48 PM    EOSABS 0.03 08/02/2022 03:48 PM    BASOSABS 0.09 08/02/2022 03:48 PM     BMP:    Lab Results   Component Value Date/Time     08/02/2022 03:48 PM    K 3.5 08/02/2022 03:48 PM     08/02/2022 03:48 PM    CO2 25 08/02/2022 03:48 PM    BUN 20 08/02/2022 03:48 PM    LABALBU 4.2 08/02/2022 03:48 PM    LABALBU 4.7 08/31/2011 02:10 PM    CREATININE 0.8 08/02/2022 03:48 PM    CALCIUM 9.9 08/02/2022 03:48 PM    GFRAA >60 08/02/2022 03:48 PM    LABGLOM >60 08/02/2022 03:48 PM    GLUCOSE 94 08/02/2022 03:48 PM    GLUCOSE 105 08/31/2011 02:10 PM       ASSESSMENT:  Acute subcapital left femoral neck fracture  New onset atrial fibrillation with rate control. Non-insulin-dependent diabetes mellitus type 2    PLAN:  The patient presents to the hospital with an acute left femoral neck fracture without an overt inciting event. She absolutely denies any falls. The orthopedic team has provided consultation and recommendations are for orthopedic repair. As the patient is in new onset atrial fibrillation, we will asked the cardiovascular team to provide consultation for complete cardiac work-up prior to surgical intervention. This has been discussed with the orthopedic team and the patient will be tentatively scheduled for surgery tomorrow pending the above-mentioned cardiac work-up. Anticoagulation therapy will need to be considered in this patient that I believe to be a rather high fall risk. We will speak with the family when they present to the bedside.   Adequate pain control is being undertaken.     Hannah Ngo DO, D.O., [facility]  8:46 AM  8/3/2022    Electronically signed by Hannah Ngo DO on 8/3/22 at 8:46 AM EDT

## 2022-08-03 NOTE — CONSULTS
Department of Orthopedic Surgery  Resident Consult Note          Reason for Consult: Left hip fracture    HISTORY OF PRESENT ILLNESS:       Patient is a 80 y.o. female who presents with left hip pain that she states started a few weeks ago. Patient denies any known injury, states that she has had a harder time getting around due to significant left-sided hip pain for about 3 weeks now. Denies numbness/tingling/paresthesias. Denies any other orthopedic complaints at this time. Past Medical History:        Diagnosis Date    Arthritis     right knee    Diabetes mellitus ([facility] [address]     Hyperthyroidism      Past Surgical History:        Procedure Laterality Date    CATARACT REMOVAL WITH IMPLANT  2008    KNEE ARTHROSCOPY  2005    right    TONSILLECTOMY       Current Medications:   Current Facility-Administered Medications: cefTRIAXone (ROCEPHIN) 1,000 mg in sterile water 10 mL IV syringe, 1,000 mg, IntraVENous, Q24H  hydrALAZINE (APRESOLINE) injection 5 mg, 5 mg, IntraVENous, Q4H PRN  metoprolol succinate (TOPROL XL) extended release tablet 25 mg, 25 mg, Oral, Daily  lisinopril (PRINIVIL;ZESTRIL) tablet 40 mg, 40 mg, Oral, Daily  morphine (PF) injection 2 mg, 2 mg, IntraVENous, Q2H PRN **OR** morphine injection 4 mg, 4 mg, IntraVENous, Q2H PRN  0.9% NaCl with KCl 20 mEq infusion, , IntraVENous, Continuous  ondansetron (ZOFRAN) injection 4 mg, 4 mg, IntraVENous, Q6H PRN  [START ON 8/5/2022] enoxaparin (LOVENOX) injection 40 mg, 40 mg, SubCUTAneous, Daily  HYDROcodone-acetaminophen (NORCO) 5-325 MG per tablet 1 tablet, 1 tablet, Oral, Q6H PRN  Allergies:  Silvadene [silver sulfadiazine]    Social History:   TOBACCO:   reports that she has never smoked. She has never used smokeless tobacco.  ETOH:   reports current alcohol use. DRUGS:   reports no history of drug use.   ACTIVITIES OF DAILY LIVING:    OCCUPATION:    Family History:       Problem Relation Age of Onset    Heart Failure Mother     Heart Failure Father REVIEW OF SYSTEMS:  CONSTITUTIONAL:  negative for  fevers, chills  EYES:  negative for blurred vision, visual disturbance  HEENT:  negative for  hearing loss, voice change  RESPIRATORY:  negative for  dyspnea, wheezing  CARDIOVASCULAR:  negative for  chest pain, palpitations  GASTROINTESTINAL:  negative for nausea, vomiting  GENITOURINARY:  negative for frequency, urinary incontinence  HEMATOLOGIC/LYMPHATIC:  negative for bleeding and petechiae  MUSCULOSKELETAL:  positive for  left hip pain  NEUROLOGICAL:  negative for headaches, dizziness  BEHAVIOR/PSYCH:  negative for increased agitation and anxiety    PHYSICAL EXAM:    VITALS:  BP (!) 190/60 Comment: rn notified  Pulse 50   Temp 97.4 °F (36.3 °C) (Oral)   Resp 14   Ht 5' 5\" (1.651 m)   Wt 118 lb (53.5 kg)   SpO2 97%   BMI 19.64 kg/m²   CONSTITUTIONAL:  awake, alert, cooperative, no apparent distress, and appears stated age  MUSCULOSKELETAL:  Left lower Extremity:  Skin intact circumferentially  Pain with log roll  Leg externally rotated  Motor intact DF/PF/EHL  Sensation to light touch grossly intact    Secondary Exam:   No obvious injuries on secondary    DATA:    CBC:   Lab Results   Component Value Date/Time    WBC 7.6 08/02/2022 03:48 PM    RBC 4.40 08/02/2022 03:48 PM    HGB 14.1 08/02/2022 03:48 PM    HCT 40.9 08/02/2022 03:48 PM    MCV 93.0 08/02/2022 03:48 PM    MCH 32.0 08/02/2022 03:48 PM    MCHC 34.5 08/02/2022 03:48 PM    RDW 12.5 08/02/2022 03:48 PM     08/02/2022 03:48 PM    MPV 10.3 08/02/2022 03:48 PM     PT/INR:  No results found for: PROTIME, INR    Radiology Review:  3 views of the pelvis and left hip reviewed. There is a mildly displaced complete left femoral neck fracture, subcapital.  Varus malalignment, mild shortening. Cortical irregularity in the intertrochanteric region is similar bilaterally and favored to be chronic in nature.   No other fractures or dislocations    IMPRESSION:  Closed displaced left femoral neck fracture    PLAN:  Plan for OR for left hip hemiarthroplasty with Dr. Sofie Carpenter  Patient will need preoperative clearance, medicine requesting cardiology consultation  Nonweightbearing left lower extremity  N.p.o.  Pain control  Discuss with attending    Chanell Olivares DO , PGY-2  8/3/22

## 2022-08-03 NOTE — ACP (ADVANCE CARE PLANNING)
Advance Care Planning   Healthcare Decision Maker: Today we documented Decision Maker(s) consistent with Legal Next of Kin hierarchy.     Daughter Edith Pryor [phone]    Son Rosibel Cutler [phone] or [phone]

## 2022-08-03 NOTE — PROGRESS NOTES
Patient cleared for her Ortho surgery from cardiac stand point. Normal LV function, No CP. Low to moderate risk.     Jessica Shukla MD  8/3/2022  4:28 PM

## 2022-08-03 NOTE — CONSULTS
Inpatient [address] [place] [address] Cardiology Consultation      Reason for Consult: AF, cardiac risk stratification    Consulting Physician: Dr. Korey Dunn    Requesting Physician: Dr. Yamilka Cotter    Date of Consultation: 8/3/2022    HISTORY OF PRESENT ILLNESS:   Patient is a 80year old female not previously known to [address] [place] [address] Cardiology. She is being seen in consultation this hospital admission by Dr. Korey Dunn for evaluation of newly diagnosed AF and cardiac risk stratification prior to left hip surgical intervention. PMH: vitamin D deficiency    Patient presented to [facility] on August 2, 2022 with complaints of left hip pain. Per patient, she denies any recent fall or injury, but has been noting of progressively worsening left hip pain over the past couple weeks. This began to limit her activities of daily living, and thus resulted in her presenting to the ED for evaluation. She denies ever having complaints of chest pain, shortness of breath, nausea, emesis, diaphoresis, palpitations, dizziness, near-syncope or syncope. Denies PND, orthopnea or peripheral edema. Admits to non-compliance with PCP follow-up, currently taking no home medications. Appears to be unable to reach 4 METS of activity, however she denies any cardiac complaints with her baseline activity level. Please note: past medical records were reviewed per electronic medical record (EMR) - see detailed reports under Past Medical/ Surgical History. PAST MEDICAL HISTORY:    Vitamin D deficiency  ? T2DM  ? Hypothyroidism  Medical non-compliance    PAST SURGICAL HISTORY:    Past Surgical History:   Procedure Laterality Date    CATARACT REMOVAL WITH IMPLANT  2008    KNEE ARTHROSCOPY  2005    right    TONSILLECTOMY         HOME MEDICATIONS:  Prior to Admission medications    Medication Sig Start Date End Date Taking? Authorizing Provider   ascorbic acid (VITAMIN C) 500 MG tablet Take 500 mg by mouth daily.     Patient not taking: Reported on 8/2/2022 Historical Provider, MD   Glucosamine-Chondroit-Vit C-Mn (GLUCOSAMINE CHONDR 1500 COMPLX PO) Take 1 tablet by mouth daily. Patient not taking: Reported on 8/2/2022    Historical Provider, MD   vitamin D (ERGOCALCIFEROL) 400 UNITS CAPS Take 1,000 Units by mouth daily. Historical Provider, MD   aspirin 81 MG chewable tablet Take 81 mg by mouth daily. Patient not taking: Reported on 8/2/2022    Historical Provider, MD   multivitamin [facility]) per tablet Take 1 tablet by mouth daily. Historical Provider, MD       CURRENT MEDICATIONS:      Current Facility-Administered Medications:     morphine (PF) injection 2 mg, 2 mg, IntraVENous, Q2H PRN **OR** morphine injection 4 mg, 4 mg, IntraVENous, Q2H PRN, Lexi Cyr, DO, 4 mg at 08/03/22 0319    0.9% NaCl with KCl 20 mEq infusion, , IntraVENous, Continuous, Richie Quails, DO, Last Rate: 60 mL/hr at 08/02/22 2210, New Bag at 08/02/22 2210    ondansetron (ZOFRAN) injection 4 mg, 4 mg, IntraVENous, Q6H PRN, Richie Quails, DO    [START ON 8/5/2022] enoxaparin (LOVENOX) injection 40 mg, 40 mg, SubCUTAneous, Daily, Richie Quails, DO    HYDROcodone-acetaminophen (NORCO) 5-325 MG per tablet 1 tablet, 1 tablet, Oral, Q6H PRN, Richie Quails, DO      ALLERGIES:  Silvadene [silver sulfadiazine]    SOCIAL HISTORY:    Lives alone, with family checking on her regularly. Does not require assistance with ambulation. Denies former/current tobacco, alcohol or illicit drug use      FAMILY HISTORY:   Non-contributory at this time due to patient's advanced age      REVIEW OF SYSTEMS:     Negative except as noted above in HPI      PHYSICAL EXAM:   BP (!) 157/68   Pulse 69   Temp 97.6 °F (36.4 °C) (Oral)   Resp 22   Ht 5' 5\" (1.651 m)   Wt 118 lb (53.5 kg)   SpO2 100%   BMI 19.64 kg/m²   CONST:  Well developed, well nourished WF who appears stated age. Awake, alert, cooperative, no apparent distress. HEENT:   Head- Normocephalic, atraumatic.    Eyes- Conjunctivae pink, anicteric. Neck-  No stridor, trachea midline, no apparent jugular venous distention. CHEST: Chest symmetrical and non-tender to palpation. No accessory muscle use or intercostal retractions. RESPIRATORY: Lung sounds - clear throughout fields. No wheezing, rales or rhonchi. CARDIOVASCULAR:     No noted carotid bruit. Heart Ausculation- Irregularly irregular rhythm with slow rate, 7-9/4 systolic murmur throughout  PV: No lower extremity edema. No varicosities. Pedal pulses palpable, no clubbing or cyanosis. ABDOMEN: Soft, non-tender to light palpation. Bowel sounds present. MS: Good muscle strength and tone. No atrophy or abnormal movements. SKIN: Warm and dry. NEURO / PSYCH: Oriented to person, place and time. Speech clear and appropriate. Follows all commands. Pleasant affect. DATA:    Telemetry: AF HR in the 50s-60s    Diagnostic:  All diagnostic testing and lab work thus far this admission reviewed in detail. CT Abdomen/Pelvis 8/2/2022  Impression  Impacted angulated SUBCAPITAL FRACTURE OF THE LEFT FEMUR . Surgical  evaluation recommended. Diverticulosis of colon with constipation. Left Hip XRAY 8/2/2022  Impression  Acute subcapital left femoral neck fracture. CXR 8/2/2022  Impression  No acute process.       Intake/Output Summary (Last 24 hours) at 8/3/2022 0749  Last data filed at 8/3/2022 0517  Gross per 24 hour   Intake --   Output 50 ml   Net -50 ml       Labs:   CBC:   Recent Labs     08/02/22  1548   WBC 7.6   HGB 14.1   HCT 40.9        BMP:   Recent Labs     08/02/22  1548      K 3.5   CO2 25   BUN 20   CREATININE 0.8   LABGLOM >60   CALCIUM 9.9     Mag:   Recent Labs     08/02/22  1548   MG 1.9     TSH:   Recent Labs     08/02/22  1655   TSH 3.430     HgA1c:   Lab Results   Component Value Date    LABA1C 6.2 (H) 09/25/2019     FASTING LIPID PANEL:  Lab Results   Component Value Date/Time    CHOL 191 04/22/2016 12:14 PM    HDL 62 10/02/2017 12:03 PM    [address] 108 10/02/2017 12:03 PM    TRIG 116 04/22/2016 12:14 PM     LIVER PROFILE:  Recent Labs     08/02/22  1548   AST 15   ALT 5   LABALBU 4.2      08/02/22 2256 08/02/22 2157 08/02/22 1655 08/02/22 1548    Troponin, High Sensitivity 0 - 9 ng/L 33 High   35 High  CM  34 High  CM  34 High  CM       Ref Range & Units 08/02/22 1548   Pro-BNP 0 - 450 pg/mL 1,573 High       08/02/22 1500     Color, UA Straw/Yellow Yellow    Clarity, UA Clear SLCLOUDY    Glucose, Ur Negative mg/dL Negative    Bilirubin Urine Negative Negative    Ketones, Urine Negative mg/dL 15 Abnormal     Specific Gravity, UA 1.005 - 1.030 1.015    Blood, Urine Negative Negative    pH, UA 5.0 - 9.0 6.0    Protein, UA Negative mg/dL Negative    Urobilinogen, Urine <2.0 E.U./dL 2.0 Abnormal     Nitrite, Urine Negative Negative    Leukocyte Esterase, Urine Negative MODERATE Abnormal     WBC, UA 0 - 5 /HPF 10-20 Abnormal     RBC, UA 0 - 2 /HPF 0-1    Epithelial Cells, UA /HPF FEW    Bacteria, UA None Seen /HPF RARE Abnormal       URINE CULTURE PENDING      BRIEF ASSESSMENT AND PLAN:    Left hip pain with left femur fracture -- denies fall or known injury    Cardiac risk stratification prior to left hip orthopedic surgical intervention -- pending TTE, as per Dr. Korey Dunn    Newly diagnosed AF with CVR/SVR - asymptomatic, not on AV archana blocking agents. CHADsVASc at least 3 just with age and female     Newly diagnosed HTN, severely elevated on admission    ? History of T2DM, not on home medications    ? History of hypothyroidism, not on home medications    HLD    Medical non-compliance         Above case and recommendations discussed and made in collaboration with Dr. Korey Dunn. Further assessment and plan to follow as per Dr. Korey Dunn. NOTE: This report was transcribed using voice recognition software. Every effort was made to ensure accuracy; however, inadvertent computerized transcription errors may be present.       Tutu Arce, [address] [place] [address] Cardiology    Electronically signed by Sheldon Armando PA-C on 8/3/2022 at 7:49 AM        Inpatient CARDIOLOGY Consultation          Our ANAYELI exam, assessment reviewed and reflect my work. I personally saw, examined, and evaluated the patient today. I spent more than 50% of total consult time today. I am the primary author for the consult notes. I personally reviewed the medications, rhythm strips, pertinent labs and test reports. I directly participated in the medical-decision making, ordering pertinent tests and medication adjustment. Reason for Consult:  Pre-op clearance, A Fib     Date of Consultation: 8/3/2022     Patient previously not known to [facility] Cardiology         HISTORY OF PRESENT ILLNESS: 80year old with no significant past medical history presented to ER for hip pain. Sshe denies any recent fall or injury, but has been noting of progressively worsening left hip pain over the past couple weeks. This began to limit her activities of daily living, and thus resulted in her presenting to the ED for evaluation. She denies ever having complaints of chest pain, shortness of breath, nausea, emesis, diaphoresis, palpitations, dizziness, near-syncope or syncope. Denies PND, orthopnea or peripheral edema. Admits to non-compliance with PCP follow-up, currently taking no home medications. Appears to be unable to reach 4 METS of activity, however she denies any cardiac complaints with her baseline activity level. No family at bed side. REVIEW OF SYSTEMS:   Review of rest of 12 systems negative except as mentioned in HPI.         Past Medical History:         Past Medical History:   Diagnosis Date    Arthritis       right knee                         Past Surgical History:    Past Surgical History         Past Surgical History:   Procedure Laterality Date    CATARACT REMOVAL WITH IMPLANT   2008    KNEE ARTHROSCOPY   2005     right    TONSILLECTOMY                   Allergies:    Silvadene [silver sulfadiazine]     Social History:    Social History               Socioeconomic History    Marital status:        Spouse name: Not on file    Number of children: Not on file    Years of education: Not on file    Highest education level: Not on file   Occupational History    Not on file   Tobacco Use    Smoking status: Never    Smokeless tobacco: Never   Substance and Sexual Activity    Alcohol use: Yes       Comment: rarely    Drug use: No    Sexual activity: Not on file   Other Topics Concern    Not on file   Social History Narrative    Not on file      Social Determinants of Health      Financial Resource Strain: Not on file   Food Insecurity: Not on file   Transportation Needs: Not on file   Physical Activity: Not on file   Stress: Not on file   Social Connections: Not on file   Intimate Partner Violence: Not on file   Housing Stability: Not on file            Family History:   Family History         Family History   Problem Relation Age of Onset    Heart Failure Mother      Heart Failure Father                 Medications Prior to admit: Reviewed  Home Medications           Prior to Admission medications   Medication Sig Start Date End Date Taking? Authorizing Provider   ascorbic acid (VITAMIN C) 500 MG tablet Take 500 mg by mouth daily. Patient not taking: Reported on 8/2/2022       Historical Provider, MD   Glucosamine-Chondroit-Vit C-Mn (GLUCOSAMINE CHONDR 1500 COMPLX PO) Take 1 tablet by mouth daily. Patient not taking: Reported on 8/2/2022       Historical Provider, MD   vitamin D (ERGOCALCIFEROL) 400 UNITS CAPS Take 1,000 Units by mouth daily. Historical Provider, MD   aspirin 81 MG chewable tablet Take 81 mg by mouth daily. Patient not taking: Reported on 8/2/2022       Historical Provider, MD   multivitamin [facility]) per tablet Take 1 tablet by mouth daily.          Historical Provider, MD               DATA:       ECG - Reviewed     Tele strips: Reviewed Diagnostic:        Intake/Output Summary (Last 24 hours) at 8/3/2022 0935  Last data filed at 8/3/2022 0517      Gross per 24 hour   Intake --   Output 50 ml   Net -50 ml         IMAGING STUDIES: Reviewed     LABS:       Cardiac enzymes:No results for input(s): CKTOTAL, CKMB, CKMBINDEX in the last 72 hours. Invalid input(s): TROPONIN  CBC:       Recent Labs     08/02/22  1548   WBC 7.6   HGB 14.1   HCT 40.9         BMP:       Recent Labs     08/02/22  1548      K 3.5   CO2 25   BUN 20   CREATININE 0.8   LABGLOM >60   CALCIUM 9.9      Mag:       Recent Labs     08/02/22  1548   MG 1.9      Phos: No results for input(s): PHOS in the last 72 hours. TSH:       Recent Labs     08/02/22  1655   TSH 3.430      HgA1c:         Lab Results   Component Value Date     LABA1C 6.2 (H) 09/25/2019      No results found for: EAG  proBNP:       Recent Labs     08/02/22  1548   PROBNP 1,573*      PT/INR: No results for input(s): PROTIME, INR in the last 72 hours. APTT:No results for input(s): APTT in the last 72 hours. FASTING LIPID PANEL:        Lab Results   Component Value Date/Time     CHOL 191 04/22/2016 12:14 PM     HDL 62 10/02/2017 12:03 PM     LDLCALC 108 10/02/2017 12:03 PM     TRIG 116 04/22/2016 12:14 PM      LIVER PROFILE:      Recent Labs     08/02/22  1548   AST 15   ALT 5   LABALBU 4.2         Current Inpatient Medications:  Scheduled Medications    cefTRIAXone (ROCEPHIN) IV  1,000 mg IntraVENous Q24H    [START ON 8/5/2022] enoxaparin  40 mg SubCUTAneous Daily            IV Infusions (if any): Infusions Meds    0.9% NaCl with KCl 20 mEq 60 mL/hr at 08/02/22 2210            PHYSICAL EXAM:      /60  Pulse 50   Temp 97.4 °F (36.3 °C) (Oral)   Resp 14   Ht 5' 5\" (1.651 m)   Wt 118 lb (53.5 kg)   SpO2 97%   BMI 19.64 kg/m²      CONST:  Well developed, appears stated age. Awake, alert and no apparent distress.   HEENT:   Head- Normocephalic  Eyes- Conjunctivae pink, no icterus  Throat- Oral mucosa moist  Neck-  No stridor, no jugular venous distention. No carotid bruit. CHEST: Chest symmetrical and non-tender to palpation. No accessory muscle use  RESPIRATORY: Lung sounds - Few rhonchi  CARDIOVASCULAR:     Heart Inspection-  Heart Palpation- No thrills. Heart Ausculation- Irregularly irregular rate and rhythm, 2/6 systolic murmur. No s3 or rub  EXT: No lower extremity edema. Distal pulses palpable, no cyanosis  Left leg short  ABDOMEN: Soft, non-tender to light palpation. Bowel sounds present. No abdominal bruit  MS: Good muscle strength    : Deferred  RECTAL: Deferred  SKIN: Warm and dry  NEURO / PSYCH: Oriented to person, place; Good mood and affect. IMPRESSION / RECOMMENDATIONS:     Subcapital fracture of neck of femur, left, closed, initial encounter ([facility] [address] - No syncope - ortho following     Pre-op cardiac clearance - No chest pain or CHF. Echo showed normal EF - Once BP controlled, cleared for her Ortho surgery from Cardiac stand point. PAF - New diagnosis; Unknown duration, TSH OK, Rates controlled; Avoid AV blocking meds due to bradycardia- High AQS3PC1 VASc score. Risk benefits of [address] discussed, she is agreeable, no family at bed side; she denies any falls. Post-OP start Eliqis 2.5mg BIB, when ok with Ortho     Elevated Troponin - Borderline, flat pattern without rise/fall, No CP    Bradycardia - Avoid AV blocking medications, TSH normal. If symptomatic and HR <40 sustained, need Permanent pacemaker    Hypertension - Add Amlodipine and monitor BP              Cardiology will follow as needed     Above recommendations discussed with her. Electronically signed by Gwen Bullard MD on 8/3/2022   [facility]) Cardiology     Addendum:    Echo    Summary   Underlying A fib. Normal left ventricular chamber size. Normal left ventricular systolic function, EF 69%. Mild left ventricular concentric hypertrophy noted. Indeterminate diastolic function.    Left atrium borderline enlarged. Interatrial septum not well visualized but appears intact. Normal right ventricle size and function. There is mild mitral regurgitation. No mitral valve prolapse. The aortic valve appears mildly sclerotic. No hemodynamically significant aortic stenosis. There is mild aortic regurgitation, pressure 1/2 time 778ms. There is mild tricuspid regurgitation. Mild pulmonary hypertension,RVSP 44mmHg   Normal aortic root size. No evidence of pericardial effusion. No intra cardiac mass or thrombus. No embolic source. No previous echo for comparison.     Bruno Gurrola MD

## 2022-08-03 NOTE — CARE COORDINATION
SW consult noted for SNF. Spoke with patient and her son, Adelaide Costa who is at bedside. She states she lives home alone in a 1 story home with 2 steps to enter. She reports prior to admission she was independent, uses no DME to ambulate and was driving. Her son lives down the road form her and her daughter lives local too. They check on patient daily and she has good family support. Patient does have a basement and she goes down there for laundry. Patient states she has POA paperwork at home, but either child, Adelaide Costa or Maisha Ritchie is ok to talk to. PCP is Dr Cinda Leon and pharmacy is [facility] in [place]. No history of HHC Or BHAVYA. Patient to have Surgery tomorrow, 8/4/22. We discussed possible transition of care scenarios (HHC vs BHAVYA). Both she and son are very much wanting patient to return home on DC, they are hoping she will do well enough with therapy after sx that she can return home with [facility]. Adelaide Costa reports her children can stay with her on discharge if needed for added support. They have no preference for choice of [address], whoever has availability. If DME needed they would like [address] DME. She has a toilet riser already, and states she thinks she has a Bedside commode. Tentative referral to [facility]. Will follow for needs post surgery and therapy gail.

## 2022-08-03 NOTE — PROGRESS NOTES
Room #: 6838/5131-63  Date: 8/3/2022       Patient Name: Will Tolentino  : 1931      MRN: [id number]     Patient unavailable for physical therapy eval due to  patient with left hip fracture, surgery likely  . Will attempt PT evaluation at a later time. Thank you.        Elizabeth Martinez, PT

## 2022-08-03 NOTE — PLAN OF CARE
Problem: Safety - Adult  Goal: Free from fall injury  8/3/2022 1324 by Wong Arnett RN  Outcome: Progressing     Problem: Skin/Tissue Integrity  Goal: Absence of new skin breakdown  Description: 1. Monitor for areas of redness and/or skin breakdown  2. Assess vascular access sites hourly  3. Every 4-6 hours minimum:  Change oxygen saturation probe site  4. Every 4-6 hours:  If on nasal continuous positive airway pressure, respiratory therapy assess nares and determine need for appliance change or resting period.   Outcome: Progressing

## 2022-08-03 NOTE — CARE COORDINATION
Advance Care Planning   Healthcare Decision Maker: Today we documented Decision Maker(s) consistent with Legal Next of Kin hierarchy.      Daughter Dixie Feliciano [phone]    Son Vickie Chavez [phone] or [phone]

## 2022-08-03 NOTE — ED NOTES
Pt seems increasingly forgetful regarding situation, pt alert to self, place, and time. Dr. Bryant Florentino informed, no new orders.      Tammie Hester  08/02/22 2023

## 2022-08-03 NOTE — PROGRESS NOTES
Occupational Therapy  Date:8/3/2022  Patient Name: Marichuy Patel  MRN: [id number]  : 1931  Room: [address] [place] [address]     OT order received and appreciated. Chart reviewed. Hold OT evaluation, pt with L hip fracture. Per ortho: likely plan on OR on  . Will follow.     Kylah Franz OTR/L #[id number]

## 2022-08-03 NOTE — PLAN OF CARE
Problem: Safety - Adult  Goal: Free from fall injury  8/3/2022 1717 by Gustavo Hill RN  Outcome: Progressing     Problem: ABCDS Injury Assessment  Goal: Absence of physical injury  8/3/2022 1717 by Gustavo Hill RN  Outcome: Progressing     Problem: Skin/Tissue Integrity  Goal: Absence of new skin breakdown  Description: 1. Monitor for areas of redness and/or skin breakdown  2. Assess vascular access sites hourly  3. Every 4-6 hours minimum:  Change oxygen saturation probe site  4. Every 4-6 hours:  If on nasal continuous positive airway pressure, respiratory therapy assess nares and determine need for appliance change or resting period.   8/3/2022 1717 by Gustavo Hill RN  Outcome: Progressing

## 2022-08-04 ENCOUNTER — ANESTHESIA (OUTPATIENT)
Dept: OPERATING ROOM | Age: 87
DRG: 521 | End: 2022-08-04
Payer: MEDICARE

## 2022-08-04 ENCOUNTER — APPOINTMENT (OUTPATIENT)
Dept: GENERAL RADIOLOGY | Age: 87
DRG: 521 | End: 2022-08-04
Payer: MEDICARE

## 2022-08-04 ENCOUNTER — ANESTHESIA EVENT (OUTPATIENT)
Dept: OPERATING ROOM | Age: 87
DRG: 521 | End: 2022-08-04
Payer: MEDICARE

## 2022-08-04 LAB
ANION GAP SERPL CALCULATED.3IONS-SCNC: 16 MMOL/L (ref 7–16)
BASOPHILS ABSOLUTE: 0.06 E9/L (ref 0–0.2)
BASOPHILS RELATIVE PERCENT: 0.5 % (ref 0–2)
BUN BLDV-MCNC: 20 MG/DL (ref 6–23)
CALCIUM SERPL-MCNC: 9.5 MG/DL (ref 8.6–10.2)
CHLORIDE BLD-SCNC: 98 MMOL/L (ref 98–107)
CO2: 21 MMOL/L (ref 22–29)
CREAT SERPL-MCNC: 0.8 MG/DL (ref 0.5–1)
EKG ATRIAL RATE: 46 BPM
EKG Q-T INTERVAL: 478 MS
EKG QRS DURATION: 90 MS
EKG QTC CALCULATION (BAZETT): 431 MS
EKG R AXIS: -43 DEGREES
EKG T AXIS: 35 DEGREES
EKG VENTRICULAR RATE: 49 BPM
EOSINOPHILS ABSOLUTE: 0 E9/L (ref 0.05–0.5)
EOSINOPHILS RELATIVE PERCENT: 0 % (ref 0–6)
GFR AFRICAN AMERICAN: >60
GFR NON-AFRICAN AMERICAN: >60 ML/MIN/1.73
GLUCOSE BLD-MCNC: 135 MG/DL (ref 74–99)
HCT VFR BLD CALC: 44.3 % (ref 34–48)
HEMOGLOBIN: 15.3 G/DL (ref 11.5–15.5)
IMMATURE GRANULOCYTES #: 0.04 E9/L
IMMATURE GRANULOCYTES %: 0.3 % (ref 0–5)
LYMPHOCYTES ABSOLUTE: 1.19 E9/L (ref 1.5–4)
LYMPHOCYTES RELATIVE PERCENT: 9.1 % (ref 20–42)
MAGNESIUM: 1.7 MG/DL (ref 1.6–2.6)
MCH RBC QN AUTO: 32.3 PG (ref 26–35)
MCHC RBC AUTO-ENTMCNC: 34.5 % (ref 32–34.5)
MCV RBC AUTO: 93.7 FL (ref 80–99.9)
MONOCYTES ABSOLUTE: 0.99 E9/L (ref 0.1–0.95)
MONOCYTES RELATIVE PERCENT: 7.6 % (ref 2–12)
NEUTROPHILS ABSOLUTE: 10.74 E9/L (ref 1.8–7.3)
NEUTROPHILS RELATIVE PERCENT: 82.5 % (ref 43–80)
PDW BLD-RTO: 12.7 FL (ref 11.5–15)
PLATELET # BLD: 256 E9/L (ref 130–450)
PMV BLD AUTO: 10.5 FL (ref 7–12)
POTASSIUM SERPL-SCNC: 4.2 MMOL/L (ref 3.5–5)
RBC # BLD: 4.73 E12/L (ref 3.5–5.5)
SODIUM BLD-SCNC: 135 MMOL/L (ref 132–146)
URINE CULTURE, ROUTINE: NORMAL
WBC # BLD: 13 E9/L (ref 4.5–11.5)

## 2022-08-04 PROCEDURE — 73502 X-RAY EXAM HIP UNI 2-3 VIEWS: CPT

## 2022-08-04 PROCEDURE — 0SRB0JA REPLACEMENT OF LEFT HIP JOINT WITH SYNTHETIC SUBSTITUTE, UNCEMENTED, OPEN APPROACH: ICD-10-PCS | Performed by: ORTHOPAEDIC SURGERY

## 2022-08-04 PROCEDURE — C1776 JOINT DEVICE (IMPLANTABLE): HCPCS | Performed by: ORTHOPAEDIC SURGERY

## 2022-08-04 PROCEDURE — 6360000002 HC RX W HCPCS: Performed by: ORTHOPAEDIC SURGERY

## 2022-08-04 PROCEDURE — 7100000001 HC PACU RECOVERY - ADDTL 15 MIN: Performed by: ORTHOPAEDIC SURGERY

## 2022-08-04 PROCEDURE — 2500000003 HC RX 250 WO HCPCS: Performed by: ANESTHESIOLOGY

## 2022-08-04 PROCEDURE — 2580000003 HC RX 258: Performed by: FAMILY MEDICINE

## 2022-08-04 PROCEDURE — 1200000000 HC SEMI PRIVATE

## 2022-08-04 PROCEDURE — 6360000002 HC RX W HCPCS: Performed by: FAMILY MEDICINE

## 2022-08-04 PROCEDURE — 83735 ASSAY OF MAGNESIUM: CPT

## 2022-08-04 PROCEDURE — 2580000003 HC RX 258

## 2022-08-04 PROCEDURE — 2500000003 HC RX 250 WO HCPCS

## 2022-08-04 PROCEDURE — P9045 ALBUMIN (HUMAN), 5%, 250 ML: HCPCS

## 2022-08-04 PROCEDURE — 2580000003 HC RX 258: Performed by: NURSE PRACTITIONER

## 2022-08-04 PROCEDURE — 3600000015 HC SURGERY LEVEL 5 ADDTL 15MIN: Performed by: ORTHOPAEDIC SURGERY

## 2022-08-04 PROCEDURE — 6360000002 HC RX W HCPCS: Performed by: EMERGENCY MEDICINE

## 2022-08-04 PROCEDURE — 7100000000 HC PACU RECOVERY - FIRST 15 MIN: Performed by: ORTHOPAEDIC SURGERY

## 2022-08-04 PROCEDURE — 3700000000 HC ANESTHESIA ATTENDED CARE: Performed by: ORTHOPAEDIC SURGERY

## 2022-08-04 PROCEDURE — 3600000005 HC SURGERY LEVEL 5 BASE: Performed by: ORTHOPAEDIC SURGERY

## 2022-08-04 PROCEDURE — 6360000002 HC RX W HCPCS: Performed by: INTERNAL MEDICINE

## 2022-08-04 PROCEDURE — 85025 COMPLETE CBC W/AUTO DIFF WBC: CPT

## 2022-08-04 PROCEDURE — 2709999900 HC NON-CHARGEABLE SUPPLY: Performed by: ORTHOPAEDIC SURGERY

## 2022-08-04 PROCEDURE — 80048 BASIC METABOLIC PNL TOTAL CA: CPT

## 2022-08-04 PROCEDURE — 6360000002 HC RX W HCPCS: Performed by: NURSE PRACTITIONER

## 2022-08-04 PROCEDURE — 88305 TISSUE EXAM BY PATHOLOGIST: CPT

## 2022-08-04 PROCEDURE — 6360000002 HC RX W HCPCS

## 2022-08-04 PROCEDURE — 3700000001 HC ADD 15 MINUTES (ANESTHESIA): Performed by: ORTHOPAEDIC SURGERY

## 2022-08-04 PROCEDURE — 88311 DECALCIFY TISSUE: CPT

## 2022-08-04 PROCEDURE — 36415 COLL VENOUS BLD VENIPUNCTURE: CPT

## 2022-08-04 DEVICE — SELF CENTERING BI-POLAR HEAD 28MM ID 46MM OD
Type: IMPLANTABLE DEVICE | Site: HIP | Status: FUNCTIONAL
Brand: SELF CENTERING

## 2022-08-04 DEVICE — ARTICUL/EZE FEMORAL HEAD DIAMETER 28MM +5 12/14 TAPER
Type: IMPLANTABLE DEVICE | Site: HIP | Status: FUNCTIONAL
Brand: ARTICUL/EZE

## 2022-08-04 DEVICE — HIP H4 HEMI UNI BIPLR IMPL CAPPED H4: Type: IMPLANTABLE DEVICE | Site: HIP | Status: FUNCTIONAL

## 2022-08-04 DEVICE — SUMMIT FEMORAL STEM 12/14 TAPER BASIC PRESS-FIT SIZE 2 130MM
Type: IMPLANTABLE DEVICE | Site: HIP | Status: FUNCTIONAL
Brand: SUMMIT

## 2022-08-04 RX ORDER — SODIUM CHLORIDE, SODIUM LACTATE, POTASSIUM CHLORIDE, CALCIUM CHLORIDE 600; 310; 30; 20 MG/100ML; MG/100ML; MG/100ML; MG/100ML
INJECTION, SOLUTION INTRAVENOUS CONTINUOUS PRN
Status: DISCONTINUED | OUTPATIENT
Start: 2022-08-04 | End: 2022-08-04 | Stop reason: SDUPTHER

## 2022-08-04 RX ORDER — HYDROCODONE BITARTRATE AND ACETAMINOPHEN 5; 325 MG/1; MG/1
1 TABLET ORAL EVERY 6 HOURS PRN
Qty: 28 TABLET | Refills: 0 | Status: SHIPPED | OUTPATIENT
Start: 2022-08-04 | End: 2022-08-09 | Stop reason: SDUPTHER

## 2022-08-04 RX ORDER — EPHEDRINE SULFATE/0.9% NACL/PF 50 MG/5 ML
SYRINGE (ML) INTRAVENOUS PRN
Status: DISCONTINUED | OUTPATIENT
Start: 2022-08-04 | End: 2022-08-04 | Stop reason: SDUPTHER

## 2022-08-04 RX ORDER — SODIUM CHLORIDE 9 MG/ML
INJECTION, SOLUTION INTRAVENOUS PRN
Status: DISCONTINUED | OUTPATIENT
Start: 2022-08-04 | End: 2022-08-04 | Stop reason: HOSPADM

## 2022-08-04 RX ORDER — OXYCODONE HYDROCHLORIDE 5 MG/1
5 TABLET ORAL
Status: DISCONTINUED | OUTPATIENT
Start: 2022-08-04 | End: 2022-08-04 | Stop reason: HOSPADM

## 2022-08-04 RX ORDER — BUPIVACAINE HYDROCHLORIDE 7.5 MG/ML
INJECTION, SOLUTION INTRASPINAL
Status: COMPLETED | OUTPATIENT
Start: 2022-08-04 | End: 2022-08-04

## 2022-08-04 RX ORDER — OXYCODONE HYDROCHLORIDE 5 MG/1
5 TABLET ORAL EVERY 4 HOURS PRN
Status: DISCONTINUED | OUTPATIENT
Start: 2022-08-04 | End: 2022-08-09 | Stop reason: HOSPADM

## 2022-08-04 RX ORDER — SODIUM CHLORIDE 0.9 % (FLUSH) 0.9 %
5-40 SYRINGE (ML) INJECTION EVERY 12 HOURS SCHEDULED
Status: DISCONTINUED | OUTPATIENT
Start: 2022-08-04 | End: 2022-08-09 | Stop reason: HOSPADM

## 2022-08-04 RX ORDER — ALBUMIN, HUMAN INJ 5% 5 %
SOLUTION INTRAVENOUS PRN
Status: DISCONTINUED | OUTPATIENT
Start: 2022-08-04 | End: 2022-08-04 | Stop reason: SDUPTHER

## 2022-08-04 RX ORDER — FENTANYL CITRATE 50 UG/ML
INJECTION, SOLUTION INTRAMUSCULAR; INTRAVENOUS PRN
Status: DISCONTINUED | OUTPATIENT
Start: 2022-08-04 | End: 2022-08-04 | Stop reason: SDUPTHER

## 2022-08-04 RX ORDER — PROPOFOL 10 MG/ML
INJECTION, EMULSION INTRAVENOUS CONTINUOUS PRN
Status: DISCONTINUED | OUTPATIENT
Start: 2022-08-04 | End: 2022-08-04 | Stop reason: SDUPTHER

## 2022-08-04 RX ORDER — SODIUM CHLORIDE 9 MG/ML
INJECTION, SOLUTION INTRAVENOUS PRN
Status: DISCONTINUED | OUTPATIENT
Start: 2022-08-04 | End: 2022-08-09 | Stop reason: HOSPADM

## 2022-08-04 RX ORDER — ONDANSETRON 4 MG/1
4 TABLET, ORALLY DISINTEGRATING ORAL EVERY 8 HOURS PRN
Status: DISCONTINUED | OUTPATIENT
Start: 2022-08-04 | End: 2022-08-09 | Stop reason: HOSPADM

## 2022-08-04 RX ORDER — CEFAZOLIN 2 G/1
INJECTION, POWDER, FOR SOLUTION INTRAMUSCULAR; INTRAVENOUS
Status: DISPENSED
Start: 2022-08-04 | End: 2022-08-05

## 2022-08-04 RX ORDER — LABETALOL HYDROCHLORIDE 5 MG/ML
10 INJECTION, SOLUTION INTRAVENOUS
Status: DISCONTINUED | OUTPATIENT
Start: 2022-08-04 | End: 2022-08-04 | Stop reason: HOSPADM

## 2022-08-04 RX ORDER — GLYCOPYRROLATE 0.2 MG/ML
INJECTION INTRAMUSCULAR; INTRAVENOUS PRN
Status: DISCONTINUED | OUTPATIENT
Start: 2022-08-04 | End: 2022-08-04 | Stop reason: SDUPTHER

## 2022-08-04 RX ORDER — HYDRALAZINE HYDROCHLORIDE 20 MG/ML
10 INJECTION INTRAMUSCULAR; INTRAVENOUS
Status: DISCONTINUED | OUTPATIENT
Start: 2022-08-04 | End: 2022-08-04 | Stop reason: HOSPADM

## 2022-08-04 RX ORDER — ACETAMINOPHEN 325 MG/1
650 TABLET ORAL EVERY 4 HOURS PRN
Status: DISCONTINUED | OUTPATIENT
Start: 2022-08-04 | End: 2022-08-09 | Stop reason: HOSPADM

## 2022-08-04 RX ORDER — SODIUM CHLORIDE 0.9 % (FLUSH) 0.9 %
5-40 SYRINGE (ML) INJECTION EVERY 12 HOURS SCHEDULED
Status: DISCONTINUED | OUTPATIENT
Start: 2022-08-04 | End: 2022-08-04 | Stop reason: HOSPADM

## 2022-08-04 RX ORDER — OXYCODONE HYDROCHLORIDE 5 MG/1
10 TABLET ORAL EVERY 4 HOURS PRN
Status: DISCONTINUED | OUTPATIENT
Start: 2022-08-04 | End: 2022-08-09 | Stop reason: HOSPADM

## 2022-08-04 RX ORDER — FENTANYL CITRATE 50 UG/ML
INJECTION, SOLUTION INTRAMUSCULAR; INTRAVENOUS
Status: COMPLETED | OUTPATIENT
Start: 2022-08-04 | End: 2022-08-04

## 2022-08-04 RX ORDER — PROCHLORPERAZINE EDISYLATE 5 MG/ML
5 INJECTION INTRAMUSCULAR; INTRAVENOUS
Status: DISCONTINUED | OUTPATIENT
Start: 2022-08-04 | End: 2022-08-04 | Stop reason: HOSPADM

## 2022-08-04 RX ORDER — IPRATROPIUM BROMIDE AND ALBUTEROL SULFATE 2.5; .5 MG/3ML; MG/3ML
1 SOLUTION RESPIRATORY (INHALATION)
Status: DISCONTINUED | OUTPATIENT
Start: 2022-08-04 | End: 2022-08-04 | Stop reason: HOSPADM

## 2022-08-04 RX ORDER — DIPHENHYDRAMINE HYDROCHLORIDE 50 MG/ML
12.5 INJECTION INTRAMUSCULAR; INTRAVENOUS
Status: DISCONTINUED | OUTPATIENT
Start: 2022-08-04 | End: 2022-08-04 | Stop reason: HOSPADM

## 2022-08-04 RX ORDER — SODIUM CHLORIDE 0.9 % (FLUSH) 0.9 %
5-40 SYRINGE (ML) INJECTION PRN
Status: DISCONTINUED | OUTPATIENT
Start: 2022-08-04 | End: 2022-08-09 | Stop reason: HOSPADM

## 2022-08-04 RX ORDER — SODIUM CHLORIDE 0.9 % (FLUSH) 0.9 %
5-40 SYRINGE (ML) INJECTION PRN
Status: DISCONTINUED | OUTPATIENT
Start: 2022-08-04 | End: 2022-08-04 | Stop reason: HOSPADM

## 2022-08-04 RX ORDER — VANCOMYCIN HYDROCHLORIDE 1 G/20ML
INJECTION, POWDER, LYOPHILIZED, FOR SOLUTION INTRAVENOUS PRN
Status: DISCONTINUED | OUTPATIENT
Start: 2022-08-04 | End: 2022-08-04 | Stop reason: ALTCHOICE

## 2022-08-04 RX ORDER — ONDANSETRON 2 MG/ML
4 INJECTION INTRAMUSCULAR; INTRAVENOUS EVERY 6 HOURS PRN
Status: DISCONTINUED | OUTPATIENT
Start: 2022-08-04 | End: 2022-08-09 | Stop reason: HOSPADM

## 2022-08-04 RX ADMIN — FENTANYL CITRATE 25 MCG: 50 INJECTION, SOLUTION INTRAMUSCULAR; INTRAVENOUS at 17:33

## 2022-08-04 RX ADMIN — FENTANYL CITRATE 25 MCG: 50 INJECTION, SOLUTION INTRAMUSCULAR; INTRAVENOUS at 17:28

## 2022-08-04 RX ADMIN — CEFTRIAXONE SODIUM 1000 MG: 1 INJECTION, POWDER, FOR SOLUTION INTRAMUSCULAR; INTRAVENOUS at 08:09

## 2022-08-04 RX ADMIN — ALBUMIN (HUMAN) 12.5 G: 12.5 INJECTION, SOLUTION INTRAVENOUS at 18:46

## 2022-08-04 RX ADMIN — CEFAZOLIN 2000 MG: 2 INJECTION, POWDER, FOR SOLUTION INTRAMUSCULAR; INTRAVENOUS at 21:11

## 2022-08-04 RX ADMIN — MORPHINE SULFATE 2 MG: 2 INJECTION, SOLUTION INTRAMUSCULAR; INTRAVENOUS at 01:03

## 2022-08-04 RX ADMIN — GLYCOPYRROLATE 0.2 MG: 0.2 INJECTION INTRAMUSCULAR; INTRAVENOUS at 17:35

## 2022-08-04 RX ADMIN — MORPHINE SULFATE 4 MG: 4 INJECTION, SOLUTION INTRAMUSCULAR; INTRAVENOUS at 22:49

## 2022-08-04 RX ADMIN — ONDANSETRON 4 MG: 2 INJECTION INTRAMUSCULAR; INTRAVENOUS at 22:54

## 2022-08-04 RX ADMIN — MORPHINE SULFATE 4 MG: 4 INJECTION, SOLUTION INTRAMUSCULAR; INTRAVENOUS at 05:39

## 2022-08-04 RX ADMIN — SODIUM CHLORIDE, POTASSIUM CHLORIDE, SODIUM LACTATE AND CALCIUM CHLORIDE: 600; 310; 30; 20 INJECTION, SOLUTION INTRAVENOUS at 18:28

## 2022-08-04 RX ADMIN — PHENYLEPHRINE HYDROCHLORIDE 100 MCG: 10 INJECTION INTRAVENOUS at 18:09

## 2022-08-04 RX ADMIN — FENTANYL CITRATE 25 MCG: 50 INJECTION, SOLUTION INTRAMUSCULAR; INTRAVENOUS at 17:13

## 2022-08-04 RX ADMIN — PHENYLEPHRINE HYDROCHLORIDE 100 MCG: 10 INJECTION INTRAVENOUS at 18:55

## 2022-08-04 RX ADMIN — Medication 10 ML: at 21:12

## 2022-08-04 RX ADMIN — BUPIVACAINE HYDROCHLORIDE IN DEXTROSE 15 MG: 7.5 INJECTION, SOLUTION SUBARACHNOID at 17:35

## 2022-08-04 RX ADMIN — MORPHINE SULFATE 2 MG: 2 INJECTION, SOLUTION INTRAMUSCULAR; INTRAVENOUS at 08:20

## 2022-08-04 RX ADMIN — SODIUM CHLORIDE, POTASSIUM CHLORIDE, SODIUM LACTATE AND CALCIUM CHLORIDE: 600; 310; 30; 20 INJECTION, SOLUTION INTRAVENOUS at 17:13

## 2022-08-04 RX ADMIN — MORPHINE SULFATE 2 MG: 2 INJECTION, SOLUTION INTRAMUSCULAR; INTRAVENOUS at 03:37

## 2022-08-04 RX ADMIN — GLYCOPYRROLATE 0.2 MG: 0.2 INJECTION INTRAMUSCULAR; INTRAVENOUS at 17:37

## 2022-08-04 RX ADMIN — Medication 10 MG: at 17:57

## 2022-08-04 RX ADMIN — Medication 10 MG: at 18:27

## 2022-08-04 RX ADMIN — POTASSIUM CHLORIDE AND SODIUM CHLORIDE: 900; 150 INJECTION, SOLUTION INTRAVENOUS at 21:16

## 2022-08-04 RX ADMIN — PHENYLEPHRINE HYDROCHLORIDE 100 MCG: 10 INJECTION INTRAVENOUS at 18:41

## 2022-08-04 RX ADMIN — HYDRALAZINE HYDROCHLORIDE 5 MG: 20 INJECTION INTRAMUSCULAR; INTRAVENOUS at 09:17

## 2022-08-04 RX ADMIN — POTASSIUM CHLORIDE AND SODIUM CHLORIDE: 900; 150 INJECTION, SOLUTION INTRAVENOUS at 07:18

## 2022-08-04 RX ADMIN — CEFAZOLIN 2000 MG: 2 INJECTION, POWDER, FOR SOLUTION INTRAMUSCULAR; INTRAVENOUS at 17:55

## 2022-08-04 RX ADMIN — PROPOFOL 50 MCG/KG/MIN: 10 INJECTION, EMULSION INTRAVENOUS at 17:24

## 2022-08-04 RX ADMIN — PROPOFOL 20 MG: 10 INJECTION, EMULSION INTRAVENOUS at 17:29

## 2022-08-04 RX ADMIN — FENTANYL CITRATE 25 MCG: 50 INJECTION, SOLUTION INTRAMUSCULAR; INTRAVENOUS at 17:35

## 2022-08-04 ASSESSMENT — PAIN DESCRIPTION - LOCATION
LOCATION: HIP
LOCATION: HIP;LEG
LOCATION: HIP

## 2022-08-04 ASSESSMENT — PAIN DESCRIPTION - ORIENTATION
ORIENTATION: LEFT

## 2022-08-04 ASSESSMENT — LIFESTYLE VARIABLES: SMOKING_STATUS: 0

## 2022-08-04 ASSESSMENT — PAIN DESCRIPTION - DESCRIPTORS
DESCRIPTORS: ACHING;CRAMPING;GNAWING
DESCRIPTORS: ACHING;NAGGING;SPASM
DESCRIPTORS: ACHING

## 2022-08-04 ASSESSMENT — PAIN SCALES - GENERAL
PAINLEVEL_OUTOF10: 10
PAINLEVEL_OUTOF10: 6
PAINLEVEL_OUTOF10: 0
PAINLEVEL_OUTOF10: 0
PAINLEVEL_OUTOF10: 10
PAINLEVEL_OUTOF10: 9
PAINLEVEL_OUTOF10: 0
PAINLEVEL_OUTOF10: 10
PAINLEVEL_OUTOF10: 10

## 2022-08-04 ASSESSMENT — PAIN SCALES - PAIN ASSESSMENT IN ADVANCED DEMENTIA (PAINAD)
CONSOLABILITY: 0
TOTALSCORE: 0
BODYLANGUAGE: 0
FACIALEXPRESSION: 0
NEGVOCALIZATION: 0
BREATHING: 0

## 2022-08-04 ASSESSMENT — PAIN SCALES - WONG BAKER: WONGBAKER_NUMERICALRESPONSE: 0

## 2022-08-04 NOTE — PROGRESS NOTES
Occupational Therapy  Date:2022  Patient Name: Yanni Kohler  MRN: [id number]  : 1931  Room: [facility]/6286-     OT order received and appreciated. Chart reviewed. Hold OT evaluation, plan for OR  . Will follow.     Gray Jaramillo OTR/L #[id number]

## 2022-08-04 NOTE — ANESTHESIA PROCEDURE NOTES
Spinal Block    Patient location during procedure: OR  Reason for block: primary anesthetic  Staffing  Performed: anesthesiologist   Anesthesiologist: Amalia Kate MD  Resident/CRNA: ZAK Alamo - CRNA  Spinal Block  Patient position: sitting  Prep: ChloraPrep  Patient monitoring: cardiac monitor, continuous pulse ox, frequent blood pressure checks and oxygen  Approach: midline  Location: L3/L4  Provider prep: mask and sterile gloves  Local infiltration: lidocaine  Needle  Needle type: Pencan   Needle gauge: 25 G  Needle length: 3.5 in  Assessment  Swirl obtained: Yes  CSF: clear  Attempts: 1  Hemodynamics: stable  Preanesthetic Checklist  Completed: patient identified, IV checked, site marked, risks and benefits discussed, surgical/procedural consents, equipment checked, pre-op evaluation, timeout performed, anesthesia consent given, oxygen available and monitors applied/VS acknowledged

## 2022-08-04 NOTE — PLAN OF CARE
Problem: Safety - Adult  Goal: Free from fall injury  8/4/2022 0231 by Malik Crabtree RN  Outcome: Progressing  8/3/2022 1717 by Prosper Stalney RN  Outcome: Progressing  8/3/2022 1324 by Юлия Otero RN  Outcome: Progressing     Problem: ABCDS Injury Assessment  Goal: Absence of physical injury  8/4/2022 0231 by Malik Crabtree RN  Outcome: Progressing  8/3/2022 1717 by Prosper Stanley RN  Outcome: Progressing  8/3/2022 1324 by Юлия Otero RN  Outcome: Progressing     Problem: Skin/Tissue Integrity  Goal: Absence of new skin breakdown  Description: 1. Monitor for areas of redness and/or skin breakdown  2. Assess vascular access sites hourly  3. Every 4-6 hours minimum:  Change oxygen saturation probe site  4. Every 4-6 hours:  If on nasal continuous positive airway pressure, respiratory therapy assess nares and determine need for appliance change or resting period. 8/4/2022 0231 by Malik Crabtree RN  Outcome: Progressing  8/3/2022 1717 by Prosper Stanley RN  Outcome: Progressing  8/3/2022 1324 by Юлия Otero RN  Outcome: Progressing     Problem: Pain  Goal: Verbalizes/displays adequate comfort level or baseline comfort level  8/3/2022 1324 by Юлия Otero RN  Outcome: Progressing     Problem: Anxiety  Goal: Will report anxiety at manageable levels  Description: INTERVENTIONS:  1. Administer medication as ordered  2. Teach and rehearse alternative coping skills  3.  Provide emotional support with 1:1 interaction with staff  8/3/2022 1324 by Юлия Otero RN  Outcome: Progressing

## 2022-08-04 NOTE — DISCHARGE INSTRUCTIONS
Orthopedics Discharge Instructions   Weight bearing Status -Weight bearing as tolerated - Operative Extremity  Pain medication Per Prescription  Ice to operative/injured site for 15-30 minutes of each hour for next 3-5 days           Follow posterior hip precautions   Continue DVT Prophylaxis as Prescribed  OK to remove Aquacel dressing on post op day seven. OK to shower on post op day 2. No baths or lotions. Follow Up in Office in 2 weeks with Dr. Fantasma Castillo     Call the office at [phone] for directions or with any questions. Watch for these significant complications. Call physician if they or any other problems occur:  Fever over 101°, redness, swelling or warmth at the operative site  Unrelieved nausea                          Foul smelling or cloudy drainage at the operative site       Unrelieved pain                   Blood soaked dressing. (Some oozing may be normal)                Numb, pale, blue, cold or tingling extremity     No future appointments.

## 2022-08-04 NOTE — PROGRESS NOTES
Pt having frequent 2.1-2.6s pauses, hx of afib. Rates as low as 35, rebounding mid 40s, low 50s while sleeping. Dr. [place] notified.

## 2022-08-04 NOTE — ANESTHESIA PRE PROCEDURE
Department of Anesthesiology  Preprocedure Note       Name:  Halima Ely   Age:  80 y.o.  :  1931                                          MRN:  [id number]         Date:  2022      Surgeon: Koffi Bynum):  Alesia Houston DO    Procedure: Procedure(s):  LEFT HIP HEMIARTHROPLASTY (DEPUY)    Medications prior to admission:   Prior to Admission medications    Medication Sig Start Date End Date Taking? Authorizing Provider   ascorbic acid (VITAMIN C) 500 MG tablet Take 500 mg by mouth daily. Patient not taking: Reported on 2022    Historical Provider, MD   Glucosamine-Chondroit-Vit C-Mn (GLUCOSAMINE CHONDR 1500 COMPLX PO) Take 1 tablet by mouth daily. Patient not taking: Reported on 2022    Historical Provider, MD   vitamin D (ERGOCALCIFEROL) 400 UNITS CAPS Take 1,000 Units by mouth daily. Historical Provider, MD   aspirin 81 MG chewable tablet Take 81 mg by mouth daily. Patient not taking: Reported on 2022    Historical Provider, MD   multivitamin [facility]) per tablet Take 1 tablet by mouth daily.       Historical Provider, MD       Current medications:    Current Facility-Administered Medications   Medication Dose Route Frequency Provider Last Rate Last Admin    ceFAZolin (ANCEF) 2,000 mg in sterile water 20 mL IV syringe  2,000 mg IntraVENous On Call to DO Maria Eugenia        cefTRIAXone (ROCEPHIN) 1,000 mg in sterile water 10 mL IV syringe  1,000 mg IntraVENous Q24H ZAK Stein - CNP   1,000 mg at 22 0809    hydrALAZINE (APRESOLINE) injection 5 mg  5 mg IntraVENous Q4H PRN ZAK Stein - CNP   5 mg at 22 0917    lisinopril (PRINIVIL;ZESTRIL) tablet 40 mg  40 mg Oral Daily Radha Cobos DO   40 mg at 22 1432    amLODIPine (NORVASC) tablet 5 mg  5 mg Oral Daily Helga Go MD   5 mg at 22 2120    morphine (PF) injection 2 mg  2 mg IntraVENous Q2H PRN Ryan James DO   2 mg at 22 0820    Or    morphine injection 4 mg  4 mg IntraVENous Q2H PRN Dayo Heady, DO   4 mg at 08/04/22 0539    0.9% NaCl with KCl 20 mEq infusion   IntraVENous Continuous [place] Sera, DO 60 mL/hr at 08/04/22 0718 New Bag at 08/04/22 0718    ondansetron (ZOFRAN) injection 4 mg  4 mg IntraVENous Q6H PRN [place] Sera, DO   4 mg at 08/03/22 0855    [START ON 8/5/2022] enoxaparin (LOVENOX) injection 40 mg  40 mg SubCUTAneous Daily [place] Sera, DO        HYDROcodone-acetaminophen (NORCO) 5-325 MG per tablet 1 tablet  1 tablet Oral Q6H PRN [place], DO           Allergies:     Allergies   Allergen Reactions    Silvadene [Silver Sulfadiazine] Other (See Comments)     Burning and discomfort       Problem List:    Patient Active Problem List   Diagnosis Code    Vitamin D deficiency E55.9    Diet-controlled diabetes mellitus ([facility] E11.9    History of hypothyroidism Z86.39    Subcapital fracture of neck of femur, left, closed, initial encounter ([facility] S72.012A    Femur fracture ([facility] S72.90XA    New onset atrial fibrillation ([facility] I48.91       Past Medical History:        Diagnosis Date    Arthritis     right knee    Diabetes mellitus ([facility]     Hyperthyroidism        Past Surgical History:        Procedure Laterality Date    CATARACT REMOVAL WITH IMPLANT  2008    KNEE ARTHROSCOPY  2005    right    TONSILLECTOMY         Social History:    Social History     Tobacco Use    Smoking status: Never    Smokeless tobacco: Never   Substance Use Topics    Alcohol use: Yes     Comment: rarely                                Counseling given: Not Answered      Vital Signs (Current):   Vitals:    08/04/22 0133 08/04/22 0407 08/04/22 0724 08/04/22 1000   BP:   (!) 220/100 (!) 162/79   Pulse:   68    Resp: 18 16 16    Temp:   36.5 °C (97.7 °F)    TempSrc:   Oral    SpO2:   94%    Weight:       Height:                                                  BP Readings from Last 3 Encounters:   08/04/22 (!) 162/79   10/04/19 130/70   09/25/19 130/64 auscultation      (-) not a current smoker                           Cardiovascular:  Exercise tolerance: good (>4 METS),   (+) dysrhythmias (junctional rhythm ): atrial fibrillation, murmur: Grade 1,       ECG reviewed  Rhythm: irregular  Rate: normal  Echocardiogram reviewed    Cleared by cardiology     Beta Blocker:  Not on Beta Blocker         Neuro/Psych:   Negative Neuro/Psych ROS              GI/Hepatic/Renal: Neg GI/Hepatic/Renal ROS            Endo/Other:    (+) Diabetes ( Diet controlled)Type II DM, , hyperthyroidism: arthritis: OA., .                 Abdominal:       Abdomen: soft. Vascular: negative vascular ROS. Other Findings:      EKG 8/3/22  Junctional rhythm  Left axis deviation  Left ventricular hypertrophy with repolarization abnormality  Cannot rule out Inferior infarct , age undetermined  Anteroseptal infarct (cited on or before 02-AUG-2022)  Abnormal ECG  8/2/22  Atrial fibrillation with slow ventricular response  Possible Anterolateral infarct (cited on or before 02-AUG-2022)  Abnormal ECG    Echo 8/3/22  Summary   Underlying A fib. Normal left ventricular chamber size. Normal left ventricular systolic function, EF 15%. Mild left ventricular concentric hypertrophy noted. Indeterminate diastolic function. Left atrium borderline enlarged. Interatrial septum not well visualized but appears intact. Normal right ventricle size and function. There is mild mitral regurgitation. No mitral valve prolapse. The aortic valve appears mildly sclerotic. No hemodynamically significant aortic stenosis. There is mild aortic regurgitation, pressure 1/2 time 778ms. There is mild tricuspid regurgitation. Mild pulmonary hypertension,RVSP 44mmHg   Normal aortic root size. No evidence of pericardial effusion. No intra cardiac mass or thrombus. No embolic source. No previous echo for comparison.      Anesthesia Plan      spinal     ASA 3     (Back-up general)  Induction: intravenous. MIPS: Postoperative opioids intended and Prophylactic antiemetics administered. Anesthetic plan and risks discussed with patient. Use of blood products discussed with patient whom consented to blood products. Plan discussed with CRNA and attending.                   Dawna Addison MD  8-4-22

## 2022-08-04 NOTE — PROGRESS NOTES
Room #: 9953/5726-31  Date: 2022       Patient Name: Aftab Ayala  : 1931      MRN: [id number]     Patient unavailable for physical therapy eval due to scheduled for procedure hip hemiarthroplasty @ 1400 . Will attempt PT evaluation at a later time. Thank you.        Akanksha Echols, PT

## 2022-08-05 LAB
ANION GAP SERPL CALCULATED.3IONS-SCNC: 10 MMOL/L (ref 7–16)
BASOPHILS ABSOLUTE: 0 E9/L (ref 0–0.2)
BASOPHILS RELATIVE PERCENT: 0 % (ref 0–2)
BUN BLDV-MCNC: 27 MG/DL (ref 6–23)
CALCIUM SERPL-MCNC: 9 MG/DL (ref 8.6–10.2)
CHLORIDE BLD-SCNC: 101 MMOL/L (ref 98–107)
CO2: 22 MMOL/L (ref 22–29)
CREAT SERPL-MCNC: 0.9 MG/DL (ref 0.5–1)
EOSINOPHILS ABSOLUTE: 0 E9/L (ref 0.05–0.5)
EOSINOPHILS RELATIVE PERCENT: 0 % (ref 0–6)
GFR AFRICAN AMERICAN: >60
GFR NON-AFRICAN AMERICAN: 59 ML/MIN/1.73
GLUCOSE BLD-MCNC: 161 MG/DL (ref 74–99)
HCT VFR BLD CALC: 38.7 % (ref 34–48)
HEMOGLOBIN: 12.5 G/DL (ref 11.5–15.5)
LYMPHOCYTES ABSOLUTE: 2.29 E9/L (ref 1.5–4)
LYMPHOCYTES RELATIVE PERCENT: 16 % (ref 20–42)
MAGNESIUM: 1.7 MG/DL (ref 1.6–2.6)
MCH RBC QN AUTO: 31.4 PG (ref 26–35)
MCHC RBC AUTO-ENTMCNC: 32.3 % (ref 32–34.5)
MCV RBC AUTO: 97.2 FL (ref 80–99.9)
MONOCYTES ABSOLUTE: 1.29 E9/L (ref 0.1–0.95)
MONOCYTES RELATIVE PERCENT: 9 % (ref 2–12)
NEUTROPHILS ABSOLUTE: 10.73 E9/L (ref 1.8–7.3)
NEUTROPHILS RELATIVE PERCENT: 75 % (ref 43–80)
PDW BLD-RTO: 12.9 FL (ref 11.5–15)
PHOSPHORUS: 2.5 MG/DL (ref 2.5–4.5)
PLATELET # BLD: 188 E9/L (ref 130–450)
PMV BLD AUTO: 11 FL (ref 7–12)
POTASSIUM SERPL-SCNC: 4.5 MMOL/L (ref 3.5–5)
RBC # BLD: 3.98 E12/L (ref 3.5–5.5)
SODIUM BLD-SCNC: 133 MMOL/L (ref 132–146)
TROPONIN, HIGH SENSITIVITY: 50 NG/L (ref 0–9)
WBC # BLD: 14.3 E9/L (ref 4.5–11.5)

## 2022-08-05 PROCEDURE — 97116 GAIT TRAINING THERAPY: CPT | Performed by: PHYSICAL THERAPIST

## 2022-08-05 PROCEDURE — 6370000000 HC RX 637 (ALT 250 FOR IP): Performed by: ORTHOPAEDIC SURGERY

## 2022-08-05 PROCEDURE — 80048 BASIC METABOLIC PNL TOTAL CA: CPT

## 2022-08-05 PROCEDURE — 6360000002 HC RX W HCPCS: Performed by: ORTHOPAEDIC SURGERY

## 2022-08-05 PROCEDURE — 2580000003 HC RX 258: Performed by: FAMILY MEDICINE

## 2022-08-05 PROCEDURE — 97110 THERAPEUTIC EXERCISES: CPT | Performed by: PHYSICAL THERAPIST

## 2022-08-05 PROCEDURE — 6370000000 HC RX 637 (ALT 250 FOR IP): Performed by: INTERNAL MEDICINE

## 2022-08-05 PROCEDURE — 84484 ASSAY OF TROPONIN QUANT: CPT

## 2022-08-05 PROCEDURE — 6370000000 HC RX 637 (ALT 250 FOR IP): Performed by: FAMILY MEDICINE

## 2022-08-05 PROCEDURE — 1200000000 HC SEMI PRIVATE

## 2022-08-05 PROCEDURE — 93005 ELECTROCARDIOGRAM TRACING: CPT | Performed by: ORTHOPAEDIC SURGERY

## 2022-08-05 PROCEDURE — 99233 SBSQ HOSP IP/OBS HIGH 50: CPT | Performed by: INTERNAL MEDICINE

## 2022-08-05 PROCEDURE — 84100 ASSAY OF PHOSPHORUS: CPT

## 2022-08-05 PROCEDURE — 97530 THERAPEUTIC ACTIVITIES: CPT | Performed by: PHYSICAL THERAPIST

## 2022-08-05 PROCEDURE — 6360000002 HC RX W HCPCS: Performed by: FAMILY MEDICINE

## 2022-08-05 PROCEDURE — 97165 OT EVAL LOW COMPLEX 30 MIN: CPT

## 2022-08-05 PROCEDURE — 2580000003 HC RX 258: Performed by: ORTHOPAEDIC SURGERY

## 2022-08-05 PROCEDURE — 83735 ASSAY OF MAGNESIUM: CPT

## 2022-08-05 PROCEDURE — 97161 PT EVAL LOW COMPLEX 20 MIN: CPT | Performed by: PHYSICAL THERAPIST

## 2022-08-05 PROCEDURE — 36415 COLL VENOUS BLD VENIPUNCTURE: CPT

## 2022-08-05 PROCEDURE — 85025 COMPLETE CBC W/AUTO DIFF WBC: CPT

## 2022-08-05 RX ADMIN — POTASSIUM CHLORIDE AND SODIUM CHLORIDE: 900; 150 INJECTION, SOLUTION INTRAVENOUS at 13:59

## 2022-08-05 RX ADMIN — CEFTRIAXONE SODIUM 1000 MG: 1 INJECTION, POWDER, FOR SOLUTION INTRAMUSCULAR; INTRAVENOUS at 09:07

## 2022-08-05 RX ADMIN — ENOXAPARIN SODIUM 40 MG: 100 INJECTION SUBCUTANEOUS at 09:06

## 2022-08-05 RX ADMIN — MORPHINE SULFATE 4 MG: 4 INJECTION, SOLUTION INTRAMUSCULAR; INTRAVENOUS at 04:38

## 2022-08-05 RX ADMIN — CEFAZOLIN 2000 MG: 2 INJECTION, POWDER, FOR SOLUTION INTRAMUSCULAR; INTRAVENOUS at 04:23

## 2022-08-05 RX ADMIN — APIXABAN 2.5 MG: 5 TABLET, FILM COATED ORAL at 20:17

## 2022-08-05 RX ADMIN — MORPHINE SULFATE 2 MG: 2 INJECTION, SOLUTION INTRAMUSCULAR; INTRAVENOUS at 10:03

## 2022-08-05 RX ADMIN — HYDRALAZINE HYDROCHLORIDE 5 MG: 20 INJECTION INTRAMUSCULAR; INTRAVENOUS at 04:33

## 2022-08-05 RX ADMIN — ASPIRIN 325 MG: 325 TABLET, COATED ORAL at 09:00

## 2022-08-05 RX ADMIN — AMLODIPINE BESYLATE 5 MG: 5 TABLET ORAL at 09:00

## 2022-08-05 RX ADMIN — Medication 10 ML: at 20:18

## 2022-08-05 RX ADMIN — LISINOPRIL 40 MG: 20 TABLET ORAL at 09:14

## 2022-08-05 ASSESSMENT — PAIN SCALES - GENERAL
PAINLEVEL_OUTOF10: 9
PAINLEVEL_OUTOF10: 5
PAINLEVEL_OUTOF10: 10
PAINLEVEL_OUTOF10: 10

## 2022-08-05 ASSESSMENT — PAIN DESCRIPTION - ORIENTATION: ORIENTATION: LEFT

## 2022-08-05 ASSESSMENT — PAIN DESCRIPTION - DESCRIPTORS: DESCRIPTORS: ACHING

## 2022-08-05 ASSESSMENT — PAIN DESCRIPTION - LOCATION
LOCATION: HIP
LOCATION: HIP

## 2022-08-05 NOTE — PROGRESS NOTES
Internal Medicine Progress Note    [facility]    Rupali Jones. Desire Jain, MILENA Rosales D.O., MILENA Sevilla D.O. Rudolph Christianson D.O. Corbin Fernandes, MSN, APRN, NP-C  Annette De Anda. aMrlys Bingham, MSN, APRN-CNP     Primary Care Physician: Rudolph Christianson DO   Admitting Physician:  Catie Beasley DO  Admission date and time: 8/2/2022  2:19 PM    Room:  [address] [place] [address]  Admitting diagnosis: Essential hypertension [I10]  New onset atrial fibrillation ([facility] [I48.91]  Subcapital fracture of neck of femur, left, closed, initial encounter ([facility] Sherwin Acosta  Urinary tract infection without hematuria, site unspecified [N39.0]    Patient Name: Chrystal Heard  MRN: [id number]    Date of Service: Late entry for 8/4/2022 Seen at 1000     Subjective:  Ramos Melendez is a 80 y.o. female who was seen and examined today,8/5/2022, at the bedside. She was sitting up in a chair. Tolerated surgical intervention yesterday well. States pain is relatively well controlled. Appetite remains good. She denied any postoperative nausea, vomiting, chest pain or shortness of breath. Daughter Nubia present. Review of System:   Constitutional:   Denies fever or chills, weight loss or gain, fatigue or malaise. HEENT:   Denies ear pain, sore throat, sinus or eye problems. Cardiovascular:   Denies any chest pain, irregular heartbeats, or palpitations. Respiratory:   Denies shortness of breath, coughing, sputum production, hemoptysis, or wheezing. Gastrointestinal:   Denies nausea, vomiting, diarrhea, or constipation. Denies any abdominal pain. Genitourinary:    Denies any urgency, frequency, hematuria. Voiding  without difficulty. Extremities:   Denies lower extremity swelling, edema or cyanosis. Neurology:    Denies any headache or focal neurological deficits, Denies generalized weakness or memory difficulty.    Psch:   Denies being anxious or depressed. Musculoskeletal:    Denies  myalgias, joint complaints or back pain. Surgical changes to the left lower extremity. Integumentary:   Denies any rashes, ulcers, or excoriations. Denies bruising. Hematologic/Lymphatic:  Denies bruising or bleeding. Physical Exam:  I/O this shift:  In: 360 [P.O.:360]  Out: 450 [Urine:450]    Intake/Output Summary (Last 24 hours) at 8/5/2022 1547  Last data filed at 8/5/2022 1416  Gross per 24 hour   Intake 1810 ml   Output 500 ml   Net 1310 ml   I/O last 3 completed shifts: In: 3088.5 [I.V.:2838.5; IV Piggyback:250]  Out: 275 [Urine:225; Blood:50]  Patient Vitals for the past 96 hrs (Last 3 readings):   Weight   08/05/22 0546 120 lb (54.4 kg)   08/03/22 0530 118 lb (53.5 kg)   08/02/22 1455 115 lb (52.2 kg)     Vital Signs:   Blood pressure (!) 140/76, pulse 64, temperature 98.3 °F (36.8 °C), temperature source Oral, resp. rate 18, height 5' 5\" (1.651 m), weight 120 lb (54.4 kg), SpO2 98 %. General appearance:  Alert, responsive, oriented to person, place, and time. Well preserved, alert, no distress. Head:  Normocephalic. No masses, lesions or tenderness. Eyes:  PERRLA. EOMI. Sclera clear. ENT:  Ears normal. Mucosa normal. Missing teeth  Neck:    Supple. Trachea midline. No thyromegaly. No JVD. No bruits. Heart:    Irregularly irregular rate and rhythm. Atrial fibrillation. No murmurs. Lungs:    Symmetrical. Clear to auscultation bilaterally. No wheezes. No rhonchi. No rales. Abdomen:   Soft. Non-tender. Non-distended. Bowel sounds positive. No organomegaly or masses. No pain on palpation. Extremities:    Peripheral pulses present. No peripheral edema. No ulcers. No cyanosis. No clubbing. Neurologic:    Alert x 3. No focal deficit. Cranial nerves grossly intact. No focal weakness. Psych:   Behavior is normal. Mood appears normal. Speech is not rapid and/or pressured. Musculoskeletal:   Spine ROM normal. Muscular strength intact.  Gait not tolerated    Continue current therapy. See orders for further plan of care. More than 50% of my  time was spent at the bedside counseling/coordinating care with the patient and/or family with face to face contact. This time was spent reviewing notes and laboratory data as well as instructing and counseling the patient. Time I spent with the family or surrogate(s) is included only if the patient was incapable of providing the necessary information or participating in medical decisions. I also discussed the differential diagnosis and all of the proposed management plans with the patient and individuals accompanying the patient. The patient was seen, examined and then discussed with Dr. Ramiro Acosta. Amando Conrad, ZAK - CNP,   8/5/2022  3:47 PM       I saw and evaluated the patient. I agree with the findings and the plan of care as documented in Amando Conrad NP-C's  note.     Lasha De Anda DO, D.O., Halbert Scheuermann  4:07 PM  8/5/2022

## 2022-08-05 NOTE — CARE COORDINATION
Patient accepted to [facility] on [address]  is needed. HENS started, LEI needs signed by , Ambulance form completed in Computer. RAPID COVID needs done day of DC. Update given to daughter, Lisset Wagner, left her a voicemail on her cell phone [phone].     [facility]  N/N [address] [facility] [phone]

## 2022-08-05 NOTE — PROGRESS NOTES
Dr. Homa Carpenter at the bedside, notified that the patient is oriented to person only. Unable to accurately assess dermatomes due to the patients confusion. The patient has lateral movement present at this time to bilateral lower extremities. Per Dr. Homa Carpenter okay to send the patient to the 4th floor.

## 2022-08-05 NOTE — DISCHARGE INSTR - COC
Continuity of Care Form    Patient Name: Gustavo Adame   :  1931  MRN:  [id number]    Admit date:  2022  Discharge date:  22    Code Status Order: Full Code   Advance Directives:     Admitting Physician:  Veronica Maza DO  PCP: Ellen Mott DO    Discharging Nurse: [facility] Unit/Room#: 9570/7405-29  Discharging Unit Phone Number: [phone]    Emergency Contact:   Extended Emergency Contact Information  Primary Emergency Contact: Nubia Becker  Address: [place], [place]  Home Phone: [phone]  Relation: Child  Secondary Emergency Contact: Orlando Liu  Address: [facility] [address] Phone: [phone]  Mobile Phone: [phone]  Relation: Child    Past Surgical History:  Past Surgical History:   Procedure Laterality Date    CATARACT REMOVAL WITH IMPLANT      KNEE ARTHROSCOPY      right    TONSILLECTOMY         Immunization History:   Immunization History   Administered Date(s) Administered    COVID-19, MODERNA BLUE border, Primary or Immunocompromised, (age 12y+), IM, 100 mcg/0.5mL 2021, 2021    Influenza Vaccine, unspecified formulation 2018    Influenza, Triv, inactivated, subunit, adjuvanted, IM (Fluad 65 yrs and older) 2019, 2020    Pneumococcal Conjugate 13-valent (Sal Card) 2018, 2018    Pneumococcal Polysaccharide ([id number]) 10/04/2019    Tdap (Boostrix, Adacel) 10/18/2012       Active Problems:  Patient Active Problem List   Diagnosis Code    Vitamin D deficiency E55.9    Diet-controlled diabetes mellitus ([address] E11.9    History of hypothyroidism Z86.39    Subcapital fracture of neck of femur, left, closed, initial encounter ([address] S72.012A    Femur fracture ([address] S72.90XA    New onset atrial fibrillation ([address] I48.91       Isolation/Infection:   Isolation            No Isolation          Patient Infection Status       None to display            Nurse Assessment:  Last Vital Signs: BP (!) 140/76   Pulse 64   Temp 98.3 °F (36.8 °C) (Oral)   Resp 18   Ht 5' 5\" (1.651 m)   Wt 120 lb (54.4 kg)   SpO2 98%   BMI 19.97 kg/m²     Last documented pain score (0-10 scale): Pain Level: 9  Last Weight:   Wt Readings from Last 1 Encounters:   08/05/22 120 lb (54.4 kg)     Mental Status:  oriented and alert    IV Access:  - None    Nursing Mobility/ADLs:  Walking   Assisted  Transfer  Assisted  Bathing  Assisted  Dressing  Assisted  Toileting  Assisted  Feeding  Independent  Med Admin  Assisted  Med Delivery   whole    Wound Care Documentation and Therapy:  Incision 08/04/22 Hip Left (Active)   Dressing Status Clean;Dry; Intact 08/05/22 0830   Dressing/Treatment Other (comment) 08/05/22 0830   Closure Other (Comment) 08/05/22 0830   Margins Other (Comment) 08/05/22 0830   Incision Assessment Other (Comment) 08/05/22 0830   Drainage Amount None 08/05/22 0830   Alisia-incision Assessment Other (Comment) 08/05/22 0830   Number of days: 0        Elimination:  Continence: Bowel: Yes  Bladder: Yes  Urinary Catheter: None   Colostomy/Ileostomy/Ileal Conduit: No       Date of Last BM: ***    Intake/Output Summary (Last 24 hours) at 8/5/2022 1534  Last data filed at 8/5/2022 1416  Gross per 24 hour   Intake 1810 ml   Output 500 ml   Net 1310 ml     I/O last 3 completed shifts: In: 3088.5 [I.V.:2838.5; IV Piggyback:250]  Out: 275 [Urine:225; Blood:50]    Safety Concerns: At Risk for Falls    Impairments/Disabilities:      None    Nutrition Therapy:  Current Nutrition Therapy:   - Oral Diet:  General    Routes of Feeding: Oral  Liquids: Thin Liquids  Daily Fluid Restriction: no  Last Modified Barium Swallow with Video (Video Swallowing Test): not done    Treatments at the Time of Hospital Discharge:   Respiratory Treatments: ***  Oxygen Therapy:  is not on home oxygen therapy.   Ventilator:    - No ventilator support    Rehab Therapies: Physical Therapy

## 2022-08-05 NOTE — PROGRESS NOTES
Department of Orthopedic Surgery  Resident Progress Note    Patient seen and examined. Patient is POD #1 from left hip hemiarthroplasty after a displaced left femoral neck fracture. She is confused this morning, at apparent baseline, states her pain has been well controlled and has no specific complaints. VITALS:  BP (!) 149/57   Pulse 65   Temp 97.9 °F (36.6 °C) (Oral)   Resp 18   Ht 5' 5\" (1.651 m)   Wt 118 lb (53.5 kg)   SpO2 97%   BMI 19.64 kg/m²     General: Awake and alert, confused, no apparent distress    MUSCULOSKELETAL:   left lower extremity:  Dressing C/D/I  Compartments soft and compressible  +PF/DF/EHL  +2/4 DP & PT pulses, Brisk Cap refill, Toes warm and perfused  Distal sensation grossly intact to Peroneals, Sural, Saphenous, and tibial nrs    CBC:   Lab Results   Component Value Date/Time    WBC 13.0 08/04/2022 08:21 AM    HGB 15.3 08/04/2022 08:21 AM    HCT 44.3 08/04/2022 08:21 AM     08/04/2022 08:21 AM     PT/INR:  No results found for: PROTIME, INR      ASSESSMENT  S/P left hip hemiarthroplasty on 8/4 for left displaced femoral neck fracture    PLAN      Continue physical therapy and protocol: WBAT -left LE  24 hour abx coverage  Deep venous thrombosis prophylaxis -ASA, okay to start today, early mobilization.   In Light of her A. fib diagnosis, if medicine prefers a different mode of anticoagulation that is okay from an orthopedic standpoint  PT/OT  Pain Control: IV and PO  Monitor H&H  D/C Plan: Appreciate PT/OT/SW recommendations, from an orthopedic standpoint once patient has an appropriate plan in place she can be discharged

## 2022-08-05 NOTE — PROGRESS NOTES
[facility]) Physicians        CARDIOLOGY                 INPATIENT PROGRESS NOTE          PATIENT SEEN IN FOLLOW UP FOR: Pre-op clearance and A Fib    Hospital Day: [address] is a 80year old patient not known to [facility] cardiology       SUBJECTIVE: Denies any CP or SOB, No dizzy - Had hip surgery     ROS: Review of rest of 12 systems negative except as mentioned above    OBJECTIVE: No acute distress. See Assessment     Diagnostics:       Telemetry: Reviewed     Echo 8/3/22   Summary   Underlying A fib. Normal left ventricular chamber size. Normal left ventricular systolic function, EF 30%. Mild left ventricular concentric hypertrophy noted. Indeterminate diastolic function. Left atrium borderline enlarged. Interatrial septum not well visualized but appears intact. Normal right ventricle size and function. There is mild mitral regurgitation. No mitral valve prolapse. The aortic valve appears mildly sclerotic. No hemodynamically significant aortic stenosis. There is mild aortic regurgitation, pressure 1/2 time 778ms. There is mild tricuspid regurgitation. Mild pulmonary hypertension,RVSP 44mmHg   Normal aortic root size. No evidence of pericardial effusion. No intra cardiac mass or thrombus. No embolic source. No previous echo for comparison. Intake/Output Summary (Last 24 hours) at 8/5/2022 1633  Last data filed at 8/5/2022 1416  Gross per 24 hour   Intake 1810 ml   Output 500 ml   Net 1310 ml       Labs:   CBC:   Recent Labs     08/04/22  0821   WBC 13.0*   HGB 15.3   HCT 44.3        BMP:   Recent Labs     08/04/22  0821      K 4.2   CO2 21*   BUN 20   CREATININE 0.8   LABGLOM >60   CALCIUM 9.5     Mag:   Recent Labs     08/04/22  0821   MG 1.7     Phos: No results for input(s): PHOS in the last 72 hours. TSH:   Recent Labs     08/03/22  0823   TSH 3.140     HgA1c:     BNP: No results for input(s): BNP in the last 72 hours.   PT/INR: No results for input(s): PROTIME, INR in the last 72 hours. APTT:No results for input(s): APTT in the last 72 hours. CARDIAC ENZYMES:No results for input(s): CKTOTAL, CKMB, CKMBINDEX, TROPONINI in the last 72 hours. FASTING LIPID PANEL:  Lab Results   Component Value Date/Time    CHOL 162 2022 08:23 AM    HDL 78 2022 08:23 AM    LDLCALC 70 2022 08:23 AM    TRIG 70 2022 08:23 AM     LIVER PROFILE:No results for input(s): AST, ALT, LABALBU in the last 72 hours. Current Inpatient Medications:   sodium chloride flush  5-40 mL IntraVENous 2 times per day    aspirin  325 mg Oral BID    cefTRIAXone (ROCEPHIN) IV  1,000 mg IntraVENous Q24H    lisinopril  40 mg Oral Daily    amLODIPine  5 mg Oral Daily    enoxaparin  40 mg SubCUTAneous Daily       IV Infusions (if any):   sodium chloride      0.9% NaCl with KCl 20 mEq 60 mL/hr at 22 1359         PHYSICAL EXAM:     CONSTITUTIONAL:   BP (!) 163/70   Pulse 70   Temp 97.5 °F (36.4 °C) (Oral)   Resp 16   Ht 5' 5\" (1.651 m)   Wt 120 lb (54.4 kg)   SpO2 96%   BMI 19.97 kg/m²   Pulse  Av.8  Min: 48  Max: 75  Systolic (41TVQ), CIL:869 , Min:131 , EL:276    Diastolic (04ZSL), RSW:45, Min:50, Max:135          CONST:  Well developed, appears stated age. Awake, alert and no apparent distress. HEENT:   Head- Normocephalic  Eyes- Conjunctivae pink, no icterus  Throat- Oral mucosa moist  Neck-  No stridor, no jugular venous distention. No carotid bruit. CHEST: Chest symmetrical and non-tender to palpation. No accessory muscle use  RESPIRATORY: Lung sounds - Few rhonchi  CARDIOVASCULAR:     Heart Inspection-  Heart Palpation- No thrills. Heart Ausculation- Irregularly irregular rate and rhythm, 2/6 systolic murmur. No s3 or rub  EXT: No lower extremity edema. Distal pulses palpable, no cyanosis  Left leg short  ABDOMEN: Soft, non-tender to light palpation. Bowel sounds present.  No abdominal bruit  MS: Good muscle strength    : Deferred  RECTAL: Deferred  SKIN: Warm and dry  NEURO / PSYCH: Oriented to person, place; Good mood and affect. IMPRESSION / RECOMMENDATIONS:     Subcapital fracture of neck of femur, left, closed, initial encounter ([facility] - No syncope - s/p surgery ortho following     Pre-op cardiac clearance - No chest pain or CHF. Echo showed normal EF - Once BP controlled, cleared for her Ortho surgery from Cardiac stand point. PAF - New diagnosis; Unknown duration, TSH OK, Rates controlled; Avoid AV blocking meds due to bradycardia- High [id number] VASc score. Risk benefits of [employer] discussed, she is agreeable, no family at bed side; she denies any falls.  [id number] [address] Mishra for [employer], per Ortho - Start adjusted dose Eliqis 2.5mg BIB - Advised to avoid ASA, NSAIDs      Elevated Troponin - Borderline, flat pattern without rise/fall, No CP     Bradycardia - Avoid AV blocking medications, TSH normal. If symptomatic and HR <40 sustained, need Permanent pacemaker     Hypertension - Amlodipine added, IF NEEDED increase to 10mg, monitor BP                 Cardiology will sign off     Above recommendations discussed with her and her son    F/u with [facility] cardiology 1 month      Electronically signed by Chace Sorensen MD on 8/5/2022 at 4:33 PM  [facility]) Cardiology

## 2022-08-05 NOTE — PROGRESS NOTES
[facility]  [address] [place]        Date:2022                                                  Patient Name: Samra Jones    MRN: [id number]    : 1931    Room: [address] [place], [place] [address]      Evaluating OT: Lance Bonilla OTR/L #[id number]     Referring Provider and Specific Provider Orders/Date:      22  OT eval and treat  Start:  22,   End:  22,   ONE TIME,   Standing Count:  1 Occurrences,   R         Miguel Washington, DO      Placement Recommendation: Inpatient Rehab        Diagnosis:   1. New onset atrial fibrillation (HCC)    2. Subcapital fracture of neck of femur, left, closed, initial encounter ([facility] [address]    3. Essential hypertension    4. Urinary tract infection without hematuria, site unspecified    5. S/p left hip fracture         Surgery: S/p cementless left hip replacement 22 by Dr. Jeff Lawson       Pertinent Medical History:       Past Medical History:   Diagnosis Date    Arthritis     right knee    Diabetes mellitus ([facility] [address]     Hyperthyroidism          Past Surgical History:   Procedure Laterality Date    CATARACT REMOVAL WITH IMPLANT      KNEE ARTHROSCOPY  2005    right    TONSILLECTOMY          Precautions:  Fall Risk, falls and alarm, up with assistance, full weight bearing left LE, hip precautions, safety, incontinence.       Assessment of current deficits    [x] Functional mobility  [x]ADLs  [x] Strength               [x]Cognition    [x] Functional transfers   [x] IADLs         [x] Safety Awareness   [x]Endurance    [] Fine Coordination              [x] Balance      [] Vision/perception   []Sensation     []Gross Motor Coordination  [] ROM  [] Delirium                   [] Motor Control     OT PLAN OF CARE   OT POC based on physician orders, patient diagnosis and results of clinical assessment    Frequency/Duration 2-5 days/wk for 2 weeks BID PRN     Specific OT Treatment Interventions to include:   * Instruction/training on adapted ADL techniques and AE recommendations to increase functional independence within precautions       * Training on energy conservation strategies, correct breathing pattern and techniques to improve independence/tolerance for self-care routine  * Functional transfer/mobility training/DME recommendations for increased independence, safety, and fall prevention  * Patient/Family education to increase follow through with safety techniques and functional independence  * Recommendation of environmental modifications for increased safety with functional transfers/mobility and ADLs  * Cognitive retraining/development of therapeutic activities to improve problem solving, judgement, memory, and attention for increased safety/participation in ADL/IADL tasks  * Therapeutic exercise to improve motor endurance, ROM, and functional strength for ADLs/functional transfers  * Therapeutic activities to facilitate/challenge dynamic balance, stand tolerance for increased safety and independence with ADLs  * Positioning to improve skin integrity, interaction with environment and functional independence    Recommended Adaptive Equipment: TBD     Home Living: Lives alone, single family home, 1 story with basement dwelling, steps to enter ? Bathroom set-up: Tub/shower, elevated commode. Equipment owned: wheeled walker     Prior Level of Function: Independent with ADLs , children assist with IADLs; ambulated independently. Driving: Yes     Pain Level: Pt reports mild pain in left LE with activity; Nursing notified.       Cognition: A&O: 3/4; Follows 1-2 step directions - requires cues for re-direction and attention to task    Memory: fair   Sequencing: fair    Problem solving: fair  minus    Judgement/safety: fair  minus     AMPAC   AM-PAC Daily Activity Inpatient   How much help for putting on and taking off regular lower body clothing?: Total  How much help for Bathing?: A Lot  How much help for Toileting?: Total  How much help for putting on and taking off regular upper body clothing?: A Lot  How much help for taking care of personal grooming?: A Lot  How much help for eating meals?: A Little  AM-PAC Inpatient Daily Activity Raw Score: 11  AM-PAC Inpatient ADL T-Scale Score : 29.04  ADL Inpatient CMS 0-100% Score: 70.42  ADL Inpatient CMS G-Code Modifier : CL     Functional Assessment:    Initial Eval Status  Date: 8/5/22   Treatment Status  Date: STGs = LTGs  Time frame: 10-14 days   Feeding Supervision     Independent    Grooming Moderate Assist    Supervision    UB Dressing Moderate Assist    Supervision    LB Dressing Dependent   To thread briefs over LEs and don over hips upon standing supported at walker     Minimal Assist    Bathing Maximal Assist    Minimal Assist    Toileting Dependent   For mgmt of puriwick catheter and hygiene care    Minimal Assist    Bed Mobility  Supine to sit:  Not Assessed   Sit to supine:  Not Assessed     Supine to sit: Minimal Assist   Sit to supine: Minimal Assist    Functional Transfers Sit to stand: Moderate Assist    Stand to sit: Moderate Assist     Transfer training with verbal cues for hand/feet placement throughout session to improve safety and maintain hip precautions. Moderate [place]    Functional Mobility Moderate Assist x 2 with wheeled walker to improve balance less than household distances, verbal cues for walker  sequence, joint protection, and safety.      Moderate [place] with use of wheeled walker    Balance Sitting:     Static: fair    Dynamic: fair  Standing: fair minus / poor     Sitting:     Static: good    Dynamic: good  Standing: good    Activity Tolerance fair    Increase standing tolerance >3 minutes for improved engagement with functional transfers and indep in ADLs     Visual/  Perceptual Glasses: N/a      NA      Skilled intervention of 2 clinicians required to facilitate participation, promote active engagement to complete therapy session and prevent injury of patient and staff during this encounter. Hand Dominance: Right      AROM (PROM) Strength Additional Info:  Goal:   RUE  WFL 4-/5 good  and wfl FMC/dexterity noted during ADL tasks   Improve overall RUE strength  for participation in functional tasks   LUE WFL 4-/5 good  and wfl FMC/dexterity noted during ADL tasks   Improve overall LUE strength  for participation in functional tasks     Hearing: [facility]   Sensation:   No c/o numbness or tingling  Tone:  WFL   Edema: None    Comments: RN cleared patient for OT. Upon arrival patient at EOB. Therapist facilitated and instructed pt on adapted  techniques & compensatory strategies to improve safety and independence with basic ADLs, bed mobility, functional transfers and mobility to allow pt to achieve highest level of independence and safely. Pt demonstrated poor understanding of education & follow through. At end of session, patient was in chair, daughter at bedside, with call light and phone within reach, all lines and tubes intact. Overall, patient demonstrated  decreased independence and safety during completion of ADL tasks. Pt would benefit from continued skilled OT to increase safety and independence with completion of ADL tasks and functional mobility for improved quality of life. Rehab Potential: Good for established goals. Patient / Family Goal: Family in agreement with POC       Patient and/or family were instructed on functional diagnosis, prognosis/goals and OT plan of care. Demonstrated fair understanding.      Eval Complexity: Low    Time In: 11:05 AM   Time Out: 11:30 AM    Total Treatment Time: 0       Min Units   OT Eval Low 97165  X  1    OT Eval Medium 29725      OT Eval High [id number]      OT Re-Eval [id number]            ADL/Self Care 35013     Therapeutic Activities 19919       Therapeutic Ex 91315       Orthotic Management 93198       Manual 18267     Neuro Re-Ed 29292       Non-Billable Time        Evaluation Time additionally includes thorough review of current medical information, gathering information on past medical history/social history and prior level of function, interpretation of standardized testing/informal observation of tasks, assessment of data and development of plan of care and goals.         Evaluating OT: Herman Rodriguez OTR/L #[id number]

## 2022-08-05 NOTE — PROGRESS NOTES
Physical Therapy    Physical Therapy Treatment Note/Plan of Care    Room #:  1747/7145-64  Patient Name: Gustavo Adame  YOB: 1931  MRN: [id number]    Date of Service: 8/5/2022     Tentative placement recommendation:  inpatient rehab  Equipment recommendation: To be determined      Evaluating Physical Therapist: Jumana Frey  #[id number]     Specific Provider Orders/Date/Referring Provider :  08/02/22 2145    PT eval and treat  Start:  08/02/22 2145,   End:  08/02/22 2145,   ONE TIME,   Standing Count:  1 Occurrences,   R          Korey Senate, DO     Admitting Diagnosis:   Essential hypertension [I10]  New onset atrial fibrillation ([facility] [address] [I48.91]  Subcapital fracture of neck of femur, left, closed, initial encounter ([facility] [address] [facility]  Urinary tract infection without hematuria, site unspecified [N39.0]   Visit diagnosis: Admitted with    with left hip pain /fracture, new onset a fib  Visit Diagnoses         Codes    New onset atrial fibrillation ([address]    -  Primary I48.91    Essential hypertension     I10    Urinary tract infection without hematuria, site unspecified     N39.0    S/p left hip fracture     Z87.81          Surgery:     PROVIDER:     Alisa Westbrook  DATE OF PROCEDURE:  08/04/2022  PREOPERATIVE DIAGNOSIS:  Closed, displaced subcapital fracture, left hip. POSTOPERATIVE DIAGNOSIS:  Closed, displaced subcapital fracture, left hip. OPERATION:  Cementless left hip replacement. Patient Active Problem List   Diagnosis    Vitamin D deficiency    Diet-controlled diabetes mellitus ([address]    History of hypothyroidism    Subcapital fracture of neck of femur, left, closed, initial encounter ([address]    Femur fracture ([address]    New onset atrial fibrillation ([address]       ASSESSMENT of current deficits: Patient exhibits decreased strength, balance, endurance, range of motion, coordination, and pain . l hip   impairing functional mobility, transfers, gait , gait distance, and tolerance to activity are barriers to d/c and require skilled intervention during hospital stay to attain pre hospital level of function. Decreased strength, balance and endurance  increases patient's risk for fall. Up in chair from this am.  Pain increased and nursing made aware. Distractible with max cues and assist for mobility to return to bed. Continues to internally rotate and needs assist in positioning in neutral rotation      PHYSICAL THERAPY  PLAN OF CARE       Physical therapy plan of care is established based on physician order,  patient diagnosis and clinical assessment    Current Treatment Recommendations:    -Bed Mobility: Lower extremity exercises   -Standing Balance: Perform strengthening exercises in standing to promote motor control with or without upper extremity support   -Transfers: Provide instruction on proper hand and foot position for adequate transfer of weight onto lower extremities and use of gait device if needed and Cues for hand placement, technique and safety. Provide stabilization to prevent fall   -Gait: Gait training and Standing activities to improve: base of support, weight shift, weight bearing    -Endurance: Utilize Supervised activities to increase level of endurance to allow for safe functional mobility including transfers and gait     PT long term treatment goals are located in below grid    Patient and or family understand(s) diagnosis, prognosis, and plan of care. Frequency of treatments: Patient will be seen  twice daily  for therapeutic exercise, functional retraining, endurance activities, balance exercises, family and patient education.        Prior Level of Function: Patient ambulated independently    Rehab Potential: good   for baseline    Past medical history:   Past Medical History:   Diagnosis Date    Arthritis     right knee    Diabetes mellitus ([facility] [address]     Hyperthyroidism      Past Surgical History:   Procedure Laterality Date    CATARACT REMOVAL WITH IMPLANT  2008 KNEE ARTHROSCOPY  2005    right    TONSILLECTOMY           SUBJECTIVE:    Precautions: Activity as tolerated and Ambulate patient , falls and alarm ,left lower extremity FWB (full weight bearing)     Social history: Patient lives alone in a ranch home  with 2 steps  to enter with [employer] available at d/c    Equipment owned: Toilet riser,       -PAC Basic Mobility        -[facility] Mobility Inpatient   How much difficulty turning over in bed?: A Lot  How much difficulty sitting down on / standing up from a chair with arms?: A Lot  How much difficulty moving from lying on back to sitting on side of bed?: A Lot  How much help from another person moving to and from a bed to a chair?: A Lot  How much help from another person needed to walk in hospital room?: A Lot  How much help from another person for climbing 3-5 steps with a railing?: Total  AM-PAC Inpatient Mobility Raw Score : 11  AM-PAC Inpatient T-Scale Score : 33.86  Mobility Inpatient CMS 0-100% Score: 72.57  Mobility Inpatient CMS G-Code Modifier : CL    Nursing cleared patient for PT treatment. OBJECTIVE:   Initial Evaluation  Date: 8/4/2022 Treatment Date:  8/5/2022  Short Term/ Long Term   Goals   Was pt agreeable to Eval/treatment? Yes yes    Pain Level  8/10   Left hip  10/10 nursing made aware     Bed Mobility  Rolling: Maximal assist of 1    Supine to sit: Maximal assist of 1    Sit to supine: Not assessed patient in chair    Scooting: Maximal assist of 1   Rolling: Not assessed    Supine to sit: Not assessed    Sit to supine: Maximal assist of  2   Scooting: Maximal assist of 1   Rolling: Independent    Supine to sit: Independent    Sit to supine: Independent    Scooting: Independent     Transfers Sit to stand:  Moderate assist of 1   Sit to stand: Maximal assist of  2    Sit to stand: Modified Independent     Ambulation     6 forward/backward steps using  wheeled walker with Moderate assist of  2   for walker control, balance, multiplane instability, and safety, Patient with antalgic gait left lower extremity and internal rotation, and cues for sequencing, walker approximation, safety, and proper hand placement  upright posture 3 steps using  wheeled walker with Maximal assist of 1   for walker approximation, balance, upright, weight shift, Right lower extremity progression, and safety  100 feet using  wheeled walker with Modified Independent    ROM Within functional limits except Left hip within precautions     Increase range of motion 10% of affected joints    Strength Within functional limits  except  Left lower extremity 2-/5  Within functional limits   Balance Sitting EOB:  fair    Dynamic Standing:  poor  wheeled walker  Sitting EOB: fair    Dynamic Standing: poor    Sitting EOB:  good    Dynamic Standing:  good wheeled walker      Patient is Alert & Oriented x person and place and follows one step directions    Sensation:  Patient denies numbness/tingling  Edema:  yes left lower extremity        Patient education  Patient educated on importance and purpose of adaptive device and adjusted to proper height for the patient. , hip precautions, safety , and weight bearing status       Patient response to education:   Pt verbalized understanding Pt demonstrated skill Pt requires further education in this area   Yes Partial Yes       Treatment:  Patient practiced and was instructed in the following treatment:     Therapist educated and facilitated patient on techniques to increase safety and independence with bed mobility, balance, functional transfers, and functional mobility. Instruction in hip precautions; no > 90*, crossing midline or pivoting/internal rotation  Instruction and performance of incentive inspirometer. Sat edge of bed 10 minutes with Minimal assist of 1 to increase dynamic sitting balance and activity tolerance.   seated and standing challenges     At end of session, patient in chair with daughter present call light and phone within reach,   all lines and tubes intact, nursing notified. Patient would benefit from continued skilled Physical Therapy to improve functional independence and quality of life. Patient's/ family goals   rehab      Patient and or family understand(s) diagnosis, prognosis, and plan of care. Frequency of treatments: Patient will be seen  twice daily  for therapeutic exercise, functional retraining, endurance activities, balance exercises, family and patient education.      Time in  1238  Time out  1253    Total Treatment Time  15 minutes       CPT codes:   Therapeutic activities (68871)   15 minutes  1 unit(s)    Janelle Smith, PT

## 2022-08-05 NOTE — CARE COORDINATION
SW met with patient and her daughter, Jackie Garcia who is at bedside. We discussed therapy recommendations of BHAVYA. They are in agreement. Provided a [facility] list and they want to call son, Bhupendra Cobos and discuss then give SW their choices. SW provided them with Cell number to call with choices.

## 2022-08-05 NOTE — OP NOTE
[address]                                OPERATIVE REPORT    PATIENT NAME: Hampton Brittle                    :        1931  MED REC NO:   [id number]                            ROOM:       5397  ACCOUNT NO:   [de-identified]                           ADMIT DATE: 2022  PROVIDER:     Martha Michel    DATE OF PROCEDURE:  2022    PREOPERATIVE DIAGNOSIS:  Closed, displaced subcapital fracture, left  hip. POSTOPERATIVE DIAGNOSIS:  Closed, displaced subcapital fracture, left  hip. OPERATION:  Cementless left hip replacement. SURGEON:  Martha Michel DO    OPERATIVE PROCEDURE:  The patient was brought to the operative theater  where a spinal anesthetic was induced by Department of Anesthesiology. The patient was placed in lateral position. Left hip, leg, and foot  were prepped and draped in the usual sterile fashion. A posterolateral  incision was placed over the left hip. Careful dissection was carried  down to the hip fracture. Hematoma was irrigated away. Displaced  fracture appreciated. A femoral osteotomy was performed with a power  saw one fingerbreadth below the lesser trochanter at a 45-degree angle. The fractured femoral neck and head were removed with a corkscrew  extractor. I tried a series of acetabular components and a 46-mm  acetabular shell seemed to fit quite nicely. I then entered the femur  canal with a small broach, increased up to size 2 broach. Size 2  femoral stem was tapped into place with a +5 neck and a 28-mm ball  attached to the 46-mm acetabular shell. The hip was then reduced and  all three new components seemed to fit together quite nicely. She had  full range of motion with no signs of dislocation or subluxation. Leg  lengths were equal.  I appreciated no fractures or perforation of the  femur shaft with my fingertip.   The hip appeared to be quite stable with  good alignment. I irrigated the wound, dried the wound, and closed in layered fashion. EBL was 100 mL. Sterile bulky dressing was applied to the left hip  incision and the patient was transferred back to Recovery, having  tolerated the today's operation quite well. I plan to x-ray the hip  replacement in the recovery room later this evening.         Martin Varner    D: 08/04/2022 19:09:16       T: 08/05/2022 1:37:43     [email or web address]  Job#: [id number]     Doc#: [id number]    CC:

## 2022-08-05 NOTE — PROGRESS NOTES
Internal Medicine Progress Note    [facility]    Cristina Andrade. Eliezer Herrera., F.A.C.O.ISunita Payton D.O., JATINDEROSunitaISunita Ann D.O. SHERRELL Magallon, MSN, APRN, NP-C  Danna Cox. Saray Turner, MSN, APRN-CNP     Primary Care Physician: Nestor Sibley DO   Admitting Physician:  Moris Richter DO  Admission date and time: 8/2/2022  2:19 PM    Room:  [address] [place] [address]  Admitting diagnosis: Essential hypertension [I10]  New onset atrial fibrillation ([facility] [I48.91]  Subcapital fracture of neck of femur, left, closed, initial encounter ([facility] Sinda Mitten  Urinary tract infection without hematuria, site unspecified [N39.0]    Patient Name: Martin Crane  MRN: [id number]    Date of Service: Late entry for 8/4/2022 Seen at 1000     Subjective:  William Danielson is a 80 y.o. female who was seen and examined today,8/5/2022, at the bedside. Resting in bed. States pain is well controlled. For surgery later today. Daughter Nubia present during my examination. Review of System:   Constitutional:   Denies fever or chills, weight loss or gain, fatigue or malaise. HEENT:   Denies ear pain, sore throat, sinus or eye problems. Cardiovascular:   Denies any chest pain, irregular heartbeats, or palpitations. Respiratory:   Denies shortness of breath, coughing, sputum production, hemoptysis, or wheezing. Gastrointestinal:   Denies nausea, vomiting, diarrhea, or constipation. Denies any abdominal pain. Genitourinary:    Denies any urgency, frequency, hematuria. Voiding  without difficulty. Extremities:   Denies lower extremity swelling, edema or cyanosis. Neurology:    Denies any headache or focal neurological deficits, Denies generalized weakness or memory difficulty. Psch:   Denies being anxious or depressed. Musculoskeletal:    Denies  myalgias, joint complaints or back pain. Left sided hip pain.    Integumentary:   Denies any rashes, ulcers, or excoriations. Denies bruising. Hematologic/Lymphatic:  Denies bruising or bleeding. Physical Exam:  I/O this shift:  In: 360 [P.O.:360]  Out: 450 [Urine:450]    Intake/Output Summary (Last 24 hours) at 8/5/2022 1539  Last data filed at 8/5/2022 1416  Gross per 24 hour   Intake 1810 ml   Output 500 ml   Net 1310 ml   I/O last 3 completed shifts: In: 3088.5 [I.V.:2838.5; IV Piggyback:250]  Out: 275 [Urine:225; Blood:50]  Patient Vitals for the past 96 hrs (Last 3 readings):   Weight   08/05/22 0546 120 lb (54.4 kg)   08/03/22 0530 118 lb (53.5 kg)   08/02/22 1455 115 lb (52.2 kg)     Vital Signs:   Blood pressure (!) 140/76, pulse 64, temperature 98.3 °F (36.8 °C), temperature source Oral, resp. rate 18, height 5' 5\" (1.651 m), weight 120 lb (54.4 kg), SpO2 98 %. General appearance:  Alert, responsive, oriented to person, place, and time. Well preserved, alert, no distress. Head:  Normocephalic. No masses, lesions or tenderness. Eyes:  PERRLA. EOMI. Sclera clear. ENT:  Ears normal. Mucosa normal. Missing teeth  Neck:    Supple. Trachea midline. No thyromegaly. No JVD. No bruits. Heart:    Irregularly irregular rate and rhythm. Atrial fibrillation. No murmurs. Lungs:    Symmetrical. Clear to auscultation bilaterally. No wheezes. No rhonchi. No rales. Abdomen:   Soft. Non-tender. Non-distended. Bowel sounds positive. No organomegaly or masses. No pain on palpation. Extremities:    Peripheral pulses present. No peripheral edema. No ulcers. No cyanosis. No clubbing. Neurologic:    Alert x 3. No focal deficit. Cranial nerves grossly intact. No focal weakness. Psych:   Behavior is normal. Mood appears normal. Speech is not rapid and/or pressured. Musculoskeletal:   Spine ROM normal. Muscular strength intact. Gait not assessed. Integumentary:  No rashes  Skin normal color and texture.   Genitalia/Breast:  Deferred    Medication:  Scheduled Meds:   sodium chloride flush  5-40 mL IntraVENous 2 times per day    aspirin  325 mg Oral BID    cefTRIAXone (ROCEPHIN) IV  1,000 mg IntraVENous Q24H    lisinopril  40 mg Oral Daily    amLODIPine  5 mg Oral Daily    enoxaparin  40 mg SubCUTAneous Daily     Continuous Infusions:   sodium chloride      0.9% NaCl with KCl 20 mEq 60 mL/hr at 08/05/22 1359       Objective Data:  Recent Labs     08/02/22  1548 08/04/22  0821   WBC 7.6 13.0*   RBC 4.40 4.73   HGB 14.1 15.3   HCT 40.9 44.3   MCV 93.0 93.7   MCH 32.0 32.3   MCHC 34.5 34.5   RDW 12.5 12.7    256   MPV 10.3 10.5     Recent Labs     08/02/22  1548 08/04/22  0821    135   K 3.5 4.2    98   CO2 25 21*   BUN 20 20   CREATININE 0.8 0.8   GLUCOSE 94 135*   CALCIUM 9.9 9.5   PROT 7.9  --    LABALBU 4.2  --    BILITOT 1.3*  --    ALKPHOS 108*  --    AST 15  --    ALT 5  --      No results found for: TROPONINI       Assessment:  Acute subcapital left femoral neck fracture  New onset atrial fibrillation with rate control. Non-insulin-dependent diabetes mellitus type 2    Plan: For surgical intervention later today with Dr. Lorie Dumont  Cardiology is following-commendations reviewed. Recommendation for Eliquis 2.5 mg twice daily when okay with orthopedic surgery status post surgical intervention. Physical therapy/Occupational Therapy to evaluate and treat the patient. Spoke with the patient's daughter Enriqueta Billy who was present and she agrees patient would benefit from rehabilitation at a skilled nursing facility status post discharge. Activity as tolerated  Occasions reviewed/continued/adjusted. Continue current therapy. See orders for further plan of care. More than 50% of my  time was spent at the bedside counseling/coordinating care with the patient and/or family with face to face contact. This time was spent reviewing notes and laboratory data as well as instructing and counseling the patient.  Time I spent with the family or surrogate(s) is included only if the patient was incapable of providing the necessary information or participating in medical decisions. I also discussed the differential diagnosis and all of the proposed management plans with the patient and individuals accompanying the patient. ZAK Hagan - CNP,   8/5/2022  3:39 PM         I saw and evaluated the patient. I agree with the findings and the plan of care as documented in Aime Ward NP-C's  note.     Jason Maciel DO, D.O., FACOI  3:47 PM  8/5/2022

## 2022-08-05 NOTE — PROGRESS NOTES
Physical Therapy    Physical Therapy Initial Evaluation/Plan of Care    Room #:  5026/5109-83  Patient Name: Suzanne Kate  YOB: 1931  MRN: [id number]    Date of Service: 8/5/2022     Tentative placement recommendation:  inpatient rehab  Equipment recommendation: To be determined      Evaluating Physical Therapist: Jumana Eden  #[id number]     Specific Provider Orders/Date/Referring Provider :  08/02/22 2145    PT eval and treat  Start:  08/02/22 2145,   End:  08/02/22 2145,   ONE TIME,   Standing Count:  1 Occurrences,   R          Tee Chan DO     Admitting Diagnosis:   Essential hypertension [I10]  New onset atrial fibrillation ([facility] [I48.91]  Subcapital fracture of neck of femur, left, closed, initial encounter ([facility] [address] Bennetta Proud  Urinary tract infection without hematuria, site unspecified [N39.0]   Visit diagnosis: Admitted with    with left hip pain /fracture, new onset a fib  Visit Diagnoses         Codes    New onset atrial fibrillation ([facility] [address]    -  Primary I48.91    Essential hypertension     I10    Urinary tract infection without hematuria, site unspecified     N39.0    S/p left hip fracture     Z87.81          Surgery:     PROVIDER:     Clif Ashraf  DATE OF PROCEDURE:  08/04/2022  PREOPERATIVE DIAGNOSIS:  Closed, displaced subcapital fracture, left hip. POSTOPERATIVE DIAGNOSIS:  Closed, displaced subcapital fracture, left hip. OPERATION:  Cementless left hip replacement. Patient Active Problem List   Diagnosis    Vitamin D deficiency    Diet-controlled diabetes mellitus ([address]    History of hypothyroidism    Subcapital fracture of neck of femur, left, closed, initial encounter ([address]    Femur fracture ([address]    New onset atrial fibrillation ([address]       ASSESSMENT of current deficits: Patient exhibits decreased strength, balance, endurance, range of motion, coordination, and pain . l hip   impairing functional mobility, transfers, gait , gait distance, and tolerance to , falls and alarm ,left lower extremity FWB (full weight bearing)     Social history: Patient lives alone in a ranch home  with 2 steps  to enter with [employer] available at d/c    Equipment owned: Toilet riser,       AM-PAC Basic Mobility        AM-PAC Mobility Inpatient   How much difficulty turning over in bed?: A Lot  How much difficulty sitting down on / standing up from a chair with arms?: A Lot  How much difficulty moving from lying on back to sitting on side of bed?: A Lot  How much help from another person moving to and from a bed to a chair?: A Lot  How much help from another person needed to walk in hospital room?: A Lot  How much help from another person for climbing 3-5 steps with a railing?: Total  AM-PAC Inpatient Mobility Raw Score : 11  AM-PAC Inpatient T-Scale Score : 33.86  Mobility Inpatient CMS 0-100% Score: 72.57  Mobility Inpatient CMS G-Code Modifier : CL    Nursing cleared patient for PT evaluation. The admitting diagnosis and active problem list as listed above have been reviewed prior to the initiation of this evaluation. OBJECTIVE:   Initial Evaluation  Date: 8/4/2022 Treatment Date: Short Term/ Long Term   Goals   Was pt agreeable to Eval/treatment? Yes     Pain Level  8/10   Left hip       Bed Mobility  Rolling: Maximal assist of 1    Supine to sit: Maximal assist of 1    Sit to supine: Not assessed patient in chair    Scooting: Maximal assist of 1    Rolling: Independent    Supine to sit: Independent    Sit to supine: Independent    Scooting: Independent     Transfers Sit to stand:  Moderate assist of 1    Sit to stand: Modified Independent     Ambulation     6 forward/backward steps using  wheeled walker with Moderate assist of  2   for walker control, balance, multiplane instability, and safety, Patient with antalgic gait left lower extremity and internal rotation, and cues for sequencing, walker approximation, safety, and proper hand placement  upright posture  100 feet using wheeled walker with Modified Independent    ROM Within functional limits except Left hip within precautions     Increase range of motion 10% of affected joints    Strength Within functional limits  except  Left lower extremity 2-/5  Within functional limits   Balance Sitting EOB:  fair    Dynamic Standing:  poor  wheeled walker   Sitting EOB:  good    Dynamic Standing:  good wheeled walker      Patient is Alert & Oriented x person and place and follows one step directions    Sensation:  Patient denies numbness/tingling  Edema:  yes left lower extremity        Patient education  Patient educated on role of Physical Therapy, risks of immobility, safety and plan of care,  importance of mobility while in hospital , ankle pumps, quad set and glut set for edema control, blood clot prevention, importance and purpose of adaptive device and adjusted to proper height for the patient. , hip precautions, safety , and weight bearing status       Patient response to education:   Pt verbalized understanding Pt demonstrated skill Pt requires further education in this area   Yes Partial Yes       Treatment:  Patient practiced and was instructed in the following treatment:     Therapist educated and facilitated patient on techniques to increase safety and independence with bed mobility, balance, functional transfers, and functional mobility. Instruction in hip precautions; no > 90*, crossing midline or pivoting/internal rotation  Instruction and performance of incentive inspirometer. Patient performed ankle pumps, quad sets, glut sets x 15-20 each. Active assist heelslide, hip abduction/adduction, straight leg raise x 10 each    Sat edge of bed 10 minutes with Minimal assist of 1 to increase dynamic sitting balance and activity tolerance. seated and standing challenges     At end of session, patient in chair with daughter present call light and phone within reach,   all lines and tubes intact, nursing notified.      Patient would benefit from continued skilled Physical Therapy to improve functional independence and quality of life. Patient's/ family goals   rehab      Patient and or family understand(s) diagnosis, prognosis, and plan of care. Frequency of treatments: Patient will be seen  twice daily  for therapeutic exercise, functional retraining, endurance activities, balance exercises, family and patient education. Time in  1045  Time out  1130    Total Treatment Time  25 minutes    Evaluation time includes thorough review of current medical information, gathering information on past medical history/social history and prior level of function, completion of standardized testing/informal observation of tasks, assessment of data, and development of Plan of care and goals.      CPT codes:  Low Complexity PT evaluation (62533)  Therapeutic exercises (29519)   17 minutes  1 unit(s)  Gait Training (76135) 8 minutes 1 unit(s)    Faith Charles, PT

## 2022-08-05 NOTE — CARE COORDINATION
Patients daughter provided BHAVYA choices of [facility] [place], [facility] Referrals made. Await acceptance.

## 2022-08-05 NOTE — PLAN OF CARE
Problem: Safety - Adult  Goal: Free from fall injury  Outcome: Progressing     Problem: ABCDS Injury Assessment  Goal: Absence of physical injury  Outcome: Progressing     Problem: Skin/Tissue Integrity  Goal: Absence of new skin breakdown  Description: 1. Monitor for areas of redness and/or skin breakdown  2. Assess vascular access sites hourly  3. Every 4-6 hours minimum:  Change oxygen saturation probe site  4. Every 4-6 hours:  If on nasal continuous positive airway pressure, respiratory therapy assess nares and determine need for appliance change or resting period.   Outcome: Progressing     Problem: Pain  Goal: Verbalizes/displays adequate comfort level or baseline comfort level  Outcome: Progressing     Problem: Chronic Conditions and Co-morbidities  Goal: Patient's chronic conditions and co-morbidity symptoms are monitored and maintained or improved  Outcome: Progressing

## 2022-08-06 LAB
ACANTHOCYTES: ABNORMAL
ANION GAP SERPL CALCULATED.3IONS-SCNC: 9 MMOL/L (ref 7–16)
ANISOCYTOSIS: ABNORMAL
BASOPHILS ABSOLUTE: 0 E9/L (ref 0–0.2)
BASOPHILS RELATIVE PERCENT: 0.6 % (ref 0–2)
BUN BLDV-MCNC: 25 MG/DL (ref 6–23)
BURR CELLS: ABNORMAL
CALCIUM SERPL-MCNC: 9 MG/DL (ref 8.6–10.2)
CHLORIDE BLD-SCNC: 104 MMOL/L (ref 98–107)
CO2: 22 MMOL/L (ref 22–29)
CREAT SERPL-MCNC: 0.7 MG/DL (ref 0.5–1)
EKG ATRIAL RATE: 197 BPM
EKG Q-T INTERVAL: 426 MS
EKG QRS DURATION: 78 MS
EKG QTC CALCULATION (BAZETT): 452 MS
EKG R AXIS: -41 DEGREES
EKG T AXIS: 75 DEGREES
EKG VENTRICULAR RATE: 68 BPM
EOSINOPHILS ABSOLUTE: 0.12 E9/L (ref 0.05–0.5)
EOSINOPHILS RELATIVE PERCENT: 0.9 % (ref 0–6)
GFR AFRICAN AMERICAN: >60
GFR NON-AFRICAN AMERICAN: >60 ML/MIN/1.73
GLUCOSE BLD-MCNC: 118 MG/DL (ref 74–99)
HCT VFR BLD CALC: 36.8 % (ref 34–48)
HEMOGLOBIN: 12.1 G/DL (ref 11.5–15.5)
LYMPHOCYTES ABSOLUTE: 1.28 E9/L (ref 1.5–4)
LYMPHOCYTES RELATIVE PERCENT: 9.6 % (ref 20–42)
MAGNESIUM: 1.7 MG/DL (ref 1.6–2.6)
MCH RBC QN AUTO: 31.9 PG (ref 26–35)
MCHC RBC AUTO-ENTMCNC: 32.9 % (ref 32–34.5)
MCV RBC AUTO: 97.1 FL (ref 80–99.9)
MONOCYTES ABSOLUTE: 1.28 E9/L (ref 0.1–0.95)
MONOCYTES RELATIVE PERCENT: 9.6 % (ref 2–12)
NEUTROPHILS ABSOLUTE: 10.24 E9/L (ref 1.8–7.3)
NEUTROPHILS RELATIVE PERCENT: 80 % (ref 43–80)
OVALOCYTES: ABNORMAL
PDW BLD-RTO: 12.9 FL (ref 11.5–15)
PLATELET # BLD: 176 E9/L (ref 130–450)
PMV BLD AUTO: 10.8 FL (ref 7–12)
POIKILOCYTES: ABNORMAL
POLYCHROMASIA: ABNORMAL
POTASSIUM SERPL-SCNC: 4.9 MMOL/L (ref 3.5–5)
RBC # BLD: 3.79 E12/L (ref 3.5–5.5)
SCHISTOCYTES: ABNORMAL
SODIUM BLD-SCNC: 135 MMOL/L (ref 132–146)
WBC # BLD: 12.8 E9/L (ref 4.5–11.5)

## 2022-08-06 PROCEDURE — 6360000002 HC RX W HCPCS: Performed by: ORTHOPAEDIC SURGERY

## 2022-08-06 PROCEDURE — 6370000000 HC RX 637 (ALT 250 FOR IP): Performed by: INTERNAL MEDICINE

## 2022-08-06 PROCEDURE — 80048 BASIC METABOLIC PNL TOTAL CA: CPT

## 2022-08-06 PROCEDURE — 6370000000 HC RX 637 (ALT 250 FOR IP): Performed by: ORTHOPAEDIC SURGERY

## 2022-08-06 PROCEDURE — 2580000003 HC RX 258: Performed by: FAMILY MEDICINE

## 2022-08-06 PROCEDURE — 2580000003 HC RX 258: Performed by: ORTHOPAEDIC SURGERY

## 2022-08-06 PROCEDURE — 1200000000 HC SEMI PRIVATE

## 2022-08-06 PROCEDURE — 36415 COLL VENOUS BLD VENIPUNCTURE: CPT

## 2022-08-06 PROCEDURE — 85025 COMPLETE CBC W/AUTO DIFF WBC: CPT

## 2022-08-06 PROCEDURE — 83735 ASSAY OF MAGNESIUM: CPT

## 2022-08-06 RX ADMIN — APIXABAN 2.5 MG: 5 TABLET, FILM COATED ORAL at 08:57

## 2022-08-06 RX ADMIN — HYDRALAZINE HYDROCHLORIDE 5 MG: 20 INJECTION INTRAMUSCULAR; INTRAVENOUS at 19:03

## 2022-08-06 RX ADMIN — HYDROCODONE BITARTRATE AND ACETAMINOPHEN 1 TABLET: 5; 325 TABLET ORAL at 21:27

## 2022-08-06 RX ADMIN — AMLODIPINE BESYLATE 5 MG: 5 TABLET ORAL at 08:57

## 2022-08-06 RX ADMIN — LISINOPRIL 40 MG: 20 TABLET ORAL at 08:56

## 2022-08-06 RX ADMIN — CEFTRIAXONE SODIUM 1000 MG: 1 INJECTION, POWDER, FOR SOLUTION INTRAMUSCULAR; INTRAVENOUS at 08:57

## 2022-08-06 RX ADMIN — Medication 10 ML: at 08:57

## 2022-08-06 RX ADMIN — APIXABAN 2.5 MG: 5 TABLET, FILM COATED ORAL at 21:27

## 2022-08-06 RX ADMIN — POTASSIUM CHLORIDE AND SODIUM CHLORIDE: 900; 150 INJECTION, SOLUTION INTRAVENOUS at 07:25

## 2022-08-06 ASSESSMENT — PAIN SCALES - WONG BAKER
WONGBAKER_NUMERICALRESPONSE: 0
WONGBAKER_NUMERICALRESPONSE: 0

## 2022-08-06 ASSESSMENT — PAIN SCALES - PAIN ASSESSMENT IN ADVANCED DEMENTIA (PAINAD)
NEGVOCALIZATION: 0
FACIALEXPRESSION: 0
BODYLANGUAGE: 0
BREATHING: 0
TOTALSCORE: 0
TOTALSCORE: 0
BREATHING: 0
CONSOLABILITY: 0
FACIALEXPRESSION: 0
CONSOLABILITY: 0
NEGVOCALIZATION: 0
BODYLANGUAGE: 0

## 2022-08-06 ASSESSMENT — PAIN SCALES - GENERAL
PAINLEVEL_OUTOF10: 0
PAINLEVEL_OUTOF10: 7

## 2022-08-06 ASSESSMENT — PAIN DESCRIPTION - ORIENTATION
ORIENTATION: LEFT
ORIENTATION: LEFT

## 2022-08-06 ASSESSMENT — PAIN DESCRIPTION - DESCRIPTORS
DESCRIPTORS: ACHING
DESCRIPTORS: ACHING

## 2022-08-06 ASSESSMENT — PAIN DESCRIPTION - LOCATION
LOCATION: HIP
LOCATION: HIP

## 2022-08-06 NOTE — PROGRESS NOTES
Department of Orthopedic Surgery  Resident Progress Note    Patient seen and examined. Haider Metcalf is confused and agitated this morning. She does not believe that we fixed her hip already, and is having no hip pain.   No additional complaints    VITALS:  BP (!) 144/82   Pulse 72   Temp 97.7 °F (36.5 °C) (Oral)   Resp 18   Ht 5' 5\" (1.651 m)   Wt 120 lb (54.4 kg)   SpO2 95%   BMI 19.97 kg/m²     General: Awake and alert, confused, no apparent distress    MUSCULOSKELETAL:   left lower extremity:  Dressing C/D/I  Compartments soft and compressible  +PF/DF/EHL  +2/4 DP & PT pulses, Brisk Cap refill, Toes warm and perfused  Distal sensation grossly intact to Peroneals, Sural, Saphenous, and tibial nrs    CBC:   Lab Results   Component Value Date/Time    WBC 12.8 08/06/2022 04:38 AM    HGB 12.1 08/06/2022 04:38 AM    HCT 36.8 08/06/2022 04:38 AM     08/06/2022 04:38 AM     PT/INR:  No results found for: PROTIME, INR      ASSESSMENT  S/P left hip hemiarthroplasty on 8/4 for left displaced femoral neck fracture    PLAN      Continue physical therapy and protocol: WBAT -left LE  24 hour abx coverage  Deep venous thrombosis prophylaxis -Eliquis  PT/OT  Pain Control: IV and PO  Monitor H&H  D/C Plan: Appreciate PT/OT/SW recommendations, from an orthopedic standpoint once patient has an appropriate plan in place she can be discharged

## 2022-08-06 NOTE — PROGRESS NOTES
Internal Medicine Progress Note    [facility]    Sorin Luther., JATINDEROJACKELYN Oseguera D.O., SHERRELL Zaragoza D.O. Seretha Cassis, MSN, APRN, NP-C  Micha Calderon. Angela Mariee, MSN, APRN-CNP     Primary Care Physician: Purnima Lagos DO   Admitting Physician:  Anibal Florian DO  Admission date and time: 8/2/2022  2:19 PM    Room:  [address] [place] [address]  Admitting diagnosis: Essential hypertension [I10]  New onset atrial fibrillation ([facility] [I48.91]  Subcapital fracture of neck of femur, left, closed, initial encounter ([facility] Jinx Marcelo  Urinary tract infection without hematuria, site unspecified [N39.0]    Patient Name: Mynor Jimenez  MRN: [id number]    Date of Service: Late entry for 8/4/2022 Seen at 1000     Subjective:  Annita Rogers is a 80 y.o. female who was seen and examined today,8/6/2022, at the bedside. Patient doing well. Alert and oriented. Working with physical therapy minimal amount of hip pain present. Discussed discharge planning    No family member present    Review of System:   Constitutional:   Denies fever or chills, weight loss or gain, fatigue or malaise. HEENT:   Denies ear pain, sore throat, sinus or eye problems. Cardiovascular:   Denies any chest pain, irregular heartbeats, or palpitations. Respiratory:   Denies shortness of breath, coughing, sputum production, hemoptysis, or wheezing. Gastrointestinal:   Denies nausea, vomiting, diarrhea, or constipation. Denies any abdominal pain. Genitourinary:    Denies any urgency, frequency, hematuria. Voiding  without difficulty. Extremities:   Denies lower extremity swelling, edema or cyanosis. Neurology:    Denies any headache or focal neurological deficits, Denies generalized weakness or memory difficulty. Psch:   Denies being anxious or depressed. Musculoskeletal:    Denies  myalgias, joint complaints or back pain.   Surgical changes lower extremity  Genitalia/Breast:  Deferred    Medication:  Scheduled Meds:   apixaban  2.5 mg Oral BID    sodium chloride flush  5-40 mL IntraVENous 2 times per day    cefTRIAXone (ROCEPHIN) IV  1,000 mg IntraVENous Q24H    lisinopril  40 mg Oral Daily    amLODIPine  5 mg Oral Daily     Continuous Infusions:   sodium chloride      0.9% NaCl with KCl 20 mEq 60 mL/hr at 08/06/22 0725       Objective Data:  Recent Labs     08/04/22  0821 08/05/22  1742 08/06/22  0438   WBC 13.0* 14.3* 12.8*   RBC 4.73 3.98 3.79   HGB 15.3 12.5 12.1   HCT 44.3 38.7 36.8   MCV 93.7 97.2 97.1   MCH 32.3 31.4 31.9   MCHC 34.5 32.3 32.9   RDW 12.7 12.9 12.9    188 176   MPV 10.5 11.0 10.8     Recent Labs     08/04/22  0821 08/05/22  1742 08/06/22  0438    133 135   K 4.2 4.5 4.9   CL 98 101 104   CO2 21* 22 22   BUN 20 27* 25*   CREATININE 0.8 0.9 0.7   GLUCOSE 135* 161* 118*   CALCIUM 9.5 9.0 9.0     No results found for: TROPONINI       Assessment:    Acute subcapital left femoral neck fracture status post stent was left hip replacement performed on August 4 by Dr. Jose Ospina onset atrial fibrillation with rate control. Non-insulin-dependent diabetes mellitus type 2  Atrial fibrillation with controlled rate with CHADS2 score high    Plan:     Continue physical therapy  Started on Eliquis for atrial fibrillation  Discharge planning in progress    Continue current therapy. See orders for further plan of care. More than 50% of my  time was spent at the bedside counseling/coordinating care with the patient and/or family with face to face contact. This time was spent reviewing notes and laboratory data as well as instructing and counseling the patient. Time I spent with the family or surrogate(s) is included only if the patient was incapable of providing the necessary information or participating in medical decisions.  I also discussed the differential diagnosis and all of the proposed management plans with the patient and individuals accompanying the patient.         Jason Maciel DO, D.O., [facility]  1:52 PM  8/6/2022

## 2022-08-07 LAB
ANION GAP SERPL CALCULATED.3IONS-SCNC: 9 MMOL/L (ref 7–16)
BASOPHILS ABSOLUTE: 0 E9/L (ref 0–0.2)
BASOPHILS RELATIVE PERCENT: 0.3 % (ref 0–2)
BUN BLDV-MCNC: 24 MG/DL (ref 6–23)
BURR CELLS: ABNORMAL
CALCIUM SERPL-MCNC: 9 MG/DL (ref 8.6–10.2)
CHLORIDE BLD-SCNC: 104 MMOL/L (ref 98–107)
CO2: 22 MMOL/L (ref 22–29)
CREAT SERPL-MCNC: 0.6 MG/DL (ref 0.5–1)
EOSINOPHILS ABSOLUTE: 0.24 E9/L (ref 0.05–0.5)
EOSINOPHILS RELATIVE PERCENT: 1.7 % (ref 0–6)
GFR AFRICAN AMERICAN: >60
GFR NON-AFRICAN AMERICAN: >60 ML/MIN/1.73
GLUCOSE BLD-MCNC: 136 MG/DL (ref 74–99)
HCT VFR BLD CALC: 32.3 % (ref 34–48)
HEMOGLOBIN: 10.7 G/DL (ref 11.5–15.5)
LYMPHOCYTES ABSOLUTE: 0.83 E9/L (ref 1.5–4)
LYMPHOCYTES RELATIVE PERCENT: 6.1 % (ref 20–42)
MAGNESIUM: 1.7 MG/DL (ref 1.6–2.6)
MCH RBC QN AUTO: 31.8 PG (ref 26–35)
MCHC RBC AUTO-ENTMCNC: 33.1 % (ref 32–34.5)
MCV RBC AUTO: 95.8 FL (ref 80–99.9)
MONOCYTES ABSOLUTE: 0.7 E9/L (ref 0.1–0.95)
MONOCYTES RELATIVE PERCENT: 5.2 % (ref 2–12)
NEUTROPHILS ABSOLUTE: 12.09 E9/L (ref 1.8–7.3)
NEUTROPHILS RELATIVE PERCENT: 87 % (ref 43–80)
PDW BLD-RTO: 12.7 FL (ref 11.5–15)
PHOSPHORUS: 3 MG/DL (ref 2.5–4.5)
PLATELET # BLD: 174 E9/L (ref 130–450)
PMV BLD AUTO: 11.2 FL (ref 7–12)
POIKILOCYTES: ABNORMAL
POTASSIUM SERPL-SCNC: 4.5 MMOL/L (ref 3.5–5)
RBC # BLD: 3.37 E12/L (ref 3.5–5.5)
SODIUM BLD-SCNC: 135 MMOL/L (ref 132–146)
WBC # BLD: 13.9 E9/L (ref 4.5–11.5)

## 2022-08-07 PROCEDURE — 1200000000 HC SEMI PRIVATE

## 2022-08-07 PROCEDURE — 6370000000 HC RX 637 (ALT 250 FOR IP): Performed by: NURSE PRACTITIONER

## 2022-08-07 PROCEDURE — 2580000003 HC RX 258: Performed by: FAMILY MEDICINE

## 2022-08-07 PROCEDURE — 6370000000 HC RX 637 (ALT 250 FOR IP): Performed by: ORTHOPAEDIC SURGERY

## 2022-08-07 PROCEDURE — 36415 COLL VENOUS BLD VENIPUNCTURE: CPT

## 2022-08-07 PROCEDURE — 6370000000 HC RX 637 (ALT 250 FOR IP): Performed by: INTERNAL MEDICINE

## 2022-08-07 PROCEDURE — 80048 BASIC METABOLIC PNL TOTAL CA: CPT

## 2022-08-07 PROCEDURE — 2580000003 HC RX 258: Performed by: ORTHOPAEDIC SURGERY

## 2022-08-07 PROCEDURE — 83735 ASSAY OF MAGNESIUM: CPT

## 2022-08-07 PROCEDURE — 84100 ASSAY OF PHOSPHORUS: CPT

## 2022-08-07 PROCEDURE — 97530 THERAPEUTIC ACTIVITIES: CPT

## 2022-08-07 PROCEDURE — 85025 COMPLETE CBC W/AUTO DIFF WBC: CPT

## 2022-08-07 PROCEDURE — 6360000002 HC RX W HCPCS: Performed by: ORTHOPAEDIC SURGERY

## 2022-08-07 RX ORDER — DOCUSATE SODIUM 100 MG/1
100 CAPSULE, LIQUID FILLED ORAL 2 TIMES DAILY
Status: DISCONTINUED | OUTPATIENT
Start: 2022-08-07 | End: 2022-08-09 | Stop reason: HOSPADM

## 2022-08-07 RX ORDER — POLYETHYLENE GLYCOL 3350 17 G/17G
17 POWDER, FOR SOLUTION ORAL DAILY
Status: DISCONTINUED | OUTPATIENT
Start: 2022-08-07 | End: 2022-08-09 | Stop reason: HOSPADM

## 2022-08-07 RX ADMIN — LISINOPRIL 40 MG: 20 TABLET ORAL at 08:59

## 2022-08-07 RX ADMIN — AMLODIPINE BESYLATE 5 MG: 5 TABLET ORAL at 08:59

## 2022-08-07 RX ADMIN — HYDROCODONE BITARTRATE AND ACETAMINOPHEN 1 TABLET: 5; 325 TABLET ORAL at 17:22

## 2022-08-07 RX ADMIN — DOCUSATE SODIUM 100 MG: 100 CAPSULE, LIQUID FILLED ORAL at 12:09

## 2022-08-07 RX ADMIN — POLYETHYLENE GLYCOL 3350 17 G: 17 POWDER, FOR SOLUTION ORAL at 12:09

## 2022-08-07 RX ADMIN — DOCUSATE SODIUM 100 MG: 100 CAPSULE, LIQUID FILLED ORAL at 19:48

## 2022-08-07 RX ADMIN — Medication 10 ML: at 09:00

## 2022-08-07 RX ADMIN — CEFTRIAXONE SODIUM 1000 MG: 1 INJECTION, POWDER, FOR SOLUTION INTRAMUSCULAR; INTRAVENOUS at 09:00

## 2022-08-07 RX ADMIN — APIXABAN 2.5 MG: 5 TABLET, FILM COATED ORAL at 19:48

## 2022-08-07 RX ADMIN — HYDROCODONE BITARTRATE AND ACETAMINOPHEN 1 TABLET: 5; 325 TABLET ORAL at 08:59

## 2022-08-07 RX ADMIN — MORPHINE SULFATE 4 MG: 4 INJECTION, SOLUTION INTRAMUSCULAR; INTRAVENOUS at 11:37

## 2022-08-07 ASSESSMENT — PAIN SCALES - PAIN ASSESSMENT IN ADVANCED DEMENTIA (PAINAD)
FACIALEXPRESSION: 0
NEGVOCALIZATION: 0
FACIALEXPRESSION: 0
TOTALSCORE: 0
BODYLANGUAGE: 0
TOTALSCORE: 0
CONSOLABILITY: 0
BREATHING: 0
CONSOLABILITY: 0
BREATHING: 0
NEGVOCALIZATION: 0
BODYLANGUAGE: 0

## 2022-08-07 ASSESSMENT — PAIN SCALES - WONG BAKER
WONGBAKER_NUMERICALRESPONSE: 0
WONGBAKER_NUMERICALRESPONSE: 0

## 2022-08-07 ASSESSMENT — PAIN SCALES - GENERAL
PAINLEVEL_OUTOF10: 0
PAINLEVEL_OUTOF10: 8
PAINLEVEL_OUTOF10: 4
PAINLEVEL_OUTOF10: 8
PAINLEVEL_OUTOF10: 0

## 2022-08-07 NOTE — PROGRESS NOTES
Department of Orthopedic Surgery  Resident Progress Note    Patient seen and examined. Pain is very well controlled, no complaints this morning.   VITALS:  BP (!) 166/70   Pulse 61   Temp 97.5 °F (36.4 °C) (Oral)   Resp 18   Ht 5' 5\" (1.651 m)   Wt 121 lb (54.9 kg)   SpO2 99%   BMI 20.14 kg/m²     General: Awake and alert, confused, no apparent distress    MUSCULOSKELETAL:   left lower extremity:  Dressing C/D/I  Compartments soft and compressible  +PF/DF/EHL  +2/4 DP & PT pulses, Brisk Cap refill, Toes warm and perfused  Distal sensation grossly intact to Peroneals, Sural, Saphenous, and tibial nrs    CBC:   Lab Results   Component Value Date/Time    WBC 13.9 08/07/2022 05:12 AM    HGB 10.7 08/07/2022 05:12 AM    HCT 32.3 08/07/2022 05:12 AM     08/07/2022 05:12 AM     PT/INR:  No results found for: PROTIME, INR      ASSESSMENT  S/P left hip hemiarthroplasty on 8/4 for left displaced femoral neck fracture    PLAN      Continue physical therapy and protocol: WBAT -left LE  24 hour abx coverage-completed  Deep venous thrombosis prophylaxis -Eliquis  PT/OT  Pain Control: IV and PO  Monitor H&H  D/C Plan: Appreciate PT/OT/SW recommendations, from an orthopedic standpoint once patient has an appropriate plan in place she can be discharged, current plan is [facility]

## 2022-08-07 NOTE — PROGRESS NOTES
Internal Medicine Progress Note    [facility]    Bernabe Dominguez. Gilmar Casey., JATINDEROSunitaI. Solo Sow D.O., SHERRELL Colin D.O. Louie Rubenstein, MSN, APRN, NP-C  Eli Carmona. Angel Golden, MSN, APRN-CNP     Primary Care Physician: Tom Salomon DO   Admitting Physician:  Bang Gauthier DO  Admission date and time: 8/2/2022  2:19 PM    Room:  [address] [place] [address]  Admitting diagnosis: Essential hypertension [I10]  New onset atrial fibrillation ([facility] [address] [I48.91]  Subcapital fracture of neck of femur, left, closed, initial encounter ([facility] [address] Derrick Galarza  Urinary tract infection without hematuria, site unspecified [N39.0]    Patient Name: Theo Cedeno  MRN: [id number]    Date of Service: Late entry for 8/4/2022 Seen at 1000     Subjective:  Harper Parker is a 80 y.o. female who was seen and examined today,8/7/2022, at the bedside. Patient resting comfortably. Continue to follow-up appropriately. Working with physical therapy. Awaiting discharge precertification    No family member present    Review of System:   Constitutional:   Denies fever or chills, weight loss or gain, fatigue or malaise. HEENT:   Denies ear pain, sore throat, sinus or eye problems. Cardiovascular:   Denies any chest pain, irregular heartbeats, or palpitations. Respiratory:   Denies shortness of breath, coughing, sputum production, hemoptysis, or wheezing. Gastrointestinal:   Denies nausea, vomiting, diarrhea, or constipation. Denies any abdominal pain. Genitourinary:    Denies any urgency, frequency, hematuria. Voiding  without difficulty. Extremities:   Denies lower extremity swelling, edema or cyanosis. Neurology:    Denies any headache or focal neurological deficits, Denies generalized weakness or memory difficulty. Psch:   Denies being anxious or depressed. Musculoskeletal:    Denies  myalgias, joint complaints or back pain.   Surgical changes to the left lower extremity. Integumentary:   Denies any rashes, ulcers, or excoriations. Denies bruising. Hematologic/Lymphatic:  Denies bruising or bleeding. Physical Exam:  I/O this shift:  In: 240 [P.O.:240]  Out: -     Intake/Output Summary (Last 24 hours) at 8/7/2022 1358  Last data filed at 8/7/2022 0952  Gross per 24 hour   Intake 240 ml   Output 950 ml   Net -710 ml   I/O last 3 completed shifts: In: 200 [P.O.:200]  Out: 950 [Urine:950]  Patient Vitals for the past 96 hrs (Last 3 readings):   Weight   08/07/22 0600 121 lb (54.9 kg)   08/06/22 0648 125 lb (56.7 kg)   08/05/22 0546 120 lb (54.4 kg)     Vital Signs:   Blood pressure (!) 166/70, pulse 61, temperature 97.5 °F (36.4 °C), temperature source Oral, resp. rate 18, height 5' 5\" (1.651 m), weight 121 lb (54.9 kg), SpO2 99 %. General appearance:  Alert, responsive, oriented to person, place, and time. Well preserved, alert, no distress. Head:  Normocephalic. No masses, lesions or tenderness. Eyes:  PERRLA. EOMI. Sclera clear. ENT:  Ears normal. Mucosa normal. Missing teeth  Neck:    Supple. Trachea midline. No thyromegaly. No JVD. No bruits. Heart:    Irregularly irregular rate and rhythm. Atrial fibrillation. No murmurs. Lungs:    Symmetrical. Clear to auscultation bilaterally. No wheezes. No rhonchi. No rales. Abdomen:   Soft. Non-tender. Non-distended. Bowel sounds positive. No organomegaly or masses. No pain on palpation. Extremities:    Peripheral pulses present. No peripheral edema. No ulcers. No cyanosis. No clubbing. Neurologic:    Alert x 3. No focal deficit. Cranial nerves grossly intact. No focal weakness. Psych:   Behavior is normal. Mood appears normal. Speech is not rapid and/or pressured. Musculoskeletal:   Spine ROM normal. Muscular strength intact. Gait not assessed. Surgical changes left lower extremity. Integumentary:  No rashes  Skin normal color and texture.   No changes of left lower extremity  Genitalia/Breast:  Deferred    Medication:  Scheduled Meds:   polyethylene glycol  17 g Oral Daily    docusate sodium  100 mg Oral BID    apixaban  2.5 mg Oral BID    sodium chloride flush  5-40 mL IntraVENous 2 times per day    lisinopril  40 mg Oral Daily    amLODIPine  5 mg Oral Daily     Continuous Infusions:   sodium chloride         Objective Data:  Recent Labs     08/05/22 1742 08/06/22 0438 08/07/22  0512   WBC 14.3* 12.8* 13.9*   RBC 3.98 3.79 3.37*   HGB 12.5 12.1 10.7*   HCT 38.7 36.8 32.3*   MCV 97.2 97.1 95.8   MCH 31.4 31.9 31.8   MCHC 32.3 32.9 33.1   RDW 12.9 12.9 12.7    176 174   MPV 11.0 10.8 11.2     Recent Labs     08/05/22 1742 08/06/22 0438 08/07/22 0512    135 135   K 4.5 4.9 4.5    104 104   CO2 22 22 22   BUN 27* 25* 24*   CREATININE 0.9 0.7 0.6   GLUCOSE 161* 118* 136*   CALCIUM 9.0 9.0 9.0     No results found for: TROPONINI       Assessment:    Acute subcapital left femoral neck fracture status post stent was left hip replacement performed on August 4 by Dr. Deo Bray onset atrial fibrillation with rate control. Non-insulin-dependent diabetes mellitus type 2  Atrial fibrillation with controlled rate with CHADS2 score high    Plan:     Continue physical therapy  Continue Eliquis for atrial fibrillation  Discharge planning in progress--awaiting precertification    Continue current therapy. See orders for further plan of care. More than 50% of my  time was spent at the bedside counseling/coordinating care with the patient and/or family with face to face contact. This time was spent reviewing notes and laboratory data as well as instructing and counseling the patient. Time I spent with the family or surrogate(s) is included only if the patient was incapable of providing the necessary information or participating in medical decisions.  I also discussed the differential diagnosis and all of the proposed management plans with the patient and individuals accompanying the patient.         Otoniel Carter DO, D.O., FACOI  1:58 PM  8/7/2022

## 2022-08-07 NOTE — PROGRESS NOTES
Physical Therapy    Physical Therapy Treatment Note/Plan of Care    Room #:  5195/6069-26  Patient Name: Merrick Ricks  YOB: 1931  MRN: [id number]    Date of Service: 8/7/2022     Tentative placement recommendation:  inpatient rehab  Equipment recommendation: To be determined      Evaluating Physical Therapist: Jumana Zuniga  #[id number]     Specific Provider Orders/Date/Referring Provider :  08/02/22 2145    PT eval and treat  Start:  08/02/22 2145,   End:  08/02/22 2145,   ONE TIME,   Standing Count:  1 Occurrences,   R          Hilton Baker DO     Admitting Diagnosis:   Essential hypertension [I10]  New onset atrial fibrillation ([address] [I48.91]  Subcapital fracture of neck of femur, left, closed, initial encounter ([address] Aleena Fire  Urinary tract infection without hematuria, site unspecified [N39.0]   Visit diagnosis: Admitted with    with left hip pain /fracture, new onset a fib  Visit Diagnoses         Codes    New onset atrial fibrillation ([address]    -  Primary I48.91    Essential hypertension     I10    Urinary tract infection without hematuria, site unspecified     N39.0    S/p left hip fracture     Z87.81          Surgery:     PROVIDER:     Carlos David  DATE OF PROCEDURE:  08/04/2022  PREOPERATIVE DIAGNOSIS:  Closed, displaced subcapital fracture, left hip. POSTOPERATIVE DIAGNOSIS:  Closed, displaced subcapital fracture, left hip. OPERATION:  Cementless left hip replacement. Patient Active Problem List   Diagnosis    Vitamin D deficiency    Diet-controlled diabetes mellitus ([address]    History of hypothyroidism    Subcapital fracture of neck of femur, left, closed, initial encounter ([address]    Femur fracture ([address]    New onset atrial fibrillation ([address]       ASSESSMENT of current deficits: Patient exhibits decreased strength, balance, endurance, range of motion, coordination, and pain . l hip   impairing functional mobility, transfers, gait , gait distance, and tolerance to activity are barriers to d/c and require skilled intervention during hospital stay to attain pre hospital level of function. Decreased strength, balance and endurance  increases patient's risk for fall. Short term memory deficit. Cues needed to keep Left LE in neurtral position due to internal rotation. Very distractible with cues needed to stay on task        PHYSICAL THERAPY  PLAN OF CARE       Physical therapy plan of care is established based on physician order,  patient diagnosis and clinical assessment    Current Treatment Recommendations:    -Bed Mobility: Lower extremity exercises   -Standing Balance: Perform strengthening exercises in standing to promote motor control with or without upper extremity support   -Transfers: Provide instruction on proper hand and foot position for adequate transfer of weight onto lower extremities and use of gait device if needed and Cues for hand placement, technique and safety. Provide stabilization to prevent fall   -Gait: Gait training and Standing activities to improve: base of support, weight shift, weight bearing    -Endurance: Utilize Supervised activities to increase level of endurance to allow for safe functional mobility including transfers and gait     PT long term treatment goals are located in below grid    Patient and or family understand(s) diagnosis, prognosis, and plan of care. Frequency of treatments: Patient will be seen  twice daily  for therapeutic exercise, functional retraining, endurance activities, balance exercises, family and patient education.        Prior Level of Function: Patient ambulated independently    Rehab Potential: good   for baseline    Past medical history:   Past Medical History:   Diagnosis Date    Arthritis     right knee    Diabetes mellitus ([facility] [address]     Hyperthyroidism      Past Surgical History:   Procedure Laterality Date    CATARACT REMOVAL WITH IMPLANT  2008    KNEE ARTHROSCOPY  2005    right    TONSILLECTOMY SUBJECTIVE:    Precautions: Activity as tolerated and Ambulate patient , falls and alarm ,left lower extremity FWB (full weight bearing)     Social history: Patient lives alone in a ranch home  with 2 steps  to enter with [employer] available at d/c    Equipment owned: Toilet riser,       AM-PAC Basic Mobility        AM-[facility] Mobility Inpatient   How much difficulty turning over in bed?: A Little  How much difficulty sitting down on / standing up from a chair with arms?: A Lot  How much difficulty moving from lying on back to sitting on side of bed?: A Lot  How much help from another person moving to and from a bed to a chair?: A Lot  How much help from another person needed to walk in hospital room?: A Lot  How much help from another person for climbing 3-5 steps with a railing?: Total  AM-PAC Inpatient Mobility Raw Score : 12  AM-PAC Inpatient T-Scale Score : 35.33  Mobility Inpatient CMS 0-100% Score: 68.66  Mobility Inpatient CMS G-Code Modifier : CL    Nursing cleared patient for PT treatment. OBJECTIVE:   Initial Evaluation  Date: 8/4/2022 Treatment Date:  8/7/2022  Short Term/ Long Term   Goals   Was pt agreeable to Eval/treatment? Yes yes    Pain Level  8/10   Left hip   No number given      Bed Mobility  Rolling: Maximal assist of 1    Supine to sit: Maximal assist of 1    Sit to supine: Not assessed patient in chair    Scooting: Maximal assist of 1   Rolling: Not assessed    Supine to sit: Moderate assist of  2   Sit to supine: Not assessed    Scooting: Maximal assist of 1   Rolling: Independent    Supine to sit: Independent    Sit to supine: Independent    Scooting: Independent     Transfers Sit to stand: Moderate assist of 1   Sit to stand:  Moderate assist of  2    Sit to stand: Modified Independent     Ambulation     6 forward/backward steps using  wheeled walker with Moderate assist of  2   for walker control, balance, multiplane instability, and safety, Patient with antalgic gait left lower extremity and internal rotation, and cues for sequencing, walker approximation, safety, and proper hand placement  upright posture 3 steps using  wheeled walker with Moderate assist of  2   for safety and  for walker approximation, balance, upright, weight shift, Right lower extremity progression, and safety and internal rotation keeping it in neutral position. 100 feet using  wheeled walker with Modified Independent    ROM Within functional limits except Left hip within precautions     Increase range of motion 10% of affected joints    Strength Within functional limits  except  Left lower extremity 2-/5  Within functional limits   Balance Sitting EOB:  fair    Dynamic Standing:  poor  wheeled walker  Sitting EOB: fair    Dynamic Standing: poor   flexed forward. Sitting EOB:  good    Dynamic Standing:  good wheeled walker      Patient is Alert & Oriented x person and place and follows one step directions    Sensation:  Patient denies numbness/tingling  Edema:  yes left lower extremity        Patient education  Patient educated on importance and purpose of adaptive device and adjusted to proper height for the patient. , hip precautions, safety , and weight bearing status       Patient response to education:   Pt verbalized understanding Pt demonstrated skill Pt requires further education in this area   Yes Partial Yes       Treatment:  Patient practiced and was instructed in the following treatment:     Therapist educated and facilitated patient on techniques to increase safety and independence with bed mobility, balance, functional transfers, and functional mobility. Instruction in hip precautions; no > 90*, crossing midline or pivoting/internal rotation     Sat edge of bed 5 minutes with Minimal assist of 1 to increase dynamic sitting balance and activity tolerance. Progressed to supervision. Stood took steps to chair. Max cues for sequencing. Performed LAQ.  Reviewed hip precautions patient unable remember when asked. Reaching challenges   within precautions. At end of session, patient in chair with     call light and phone within reach,   all lines and tubes intact, nursing notified. Patient would benefit from continued skilled Physical Therapy to improve functional independence and quality of life. Patient's/ family goals   rehab      Patient and or family understand(s) diagnosis, prognosis, and plan of care. Frequency of treatments: Patient will be seen  twice daily  for therapeutic exercise, functional retraining, endurance activities, balance exercises, family and patient education.      Time in  232  Time out  257    Total Treatment Time  25 minutes       CPT codes:   Therapeutic activities (71645)   23 minutes  2 unit(s)  Non billable time 2 minutes       Rosie Conteh, PTA  ONE#[id number]

## 2022-08-07 NOTE — PLAN OF CARE
Problem: Safety - Adult  Goal: Free from fall injury  8/7/2022 1453 by Chloe Branham RN  Outcome: Progressing  8/7/2022 1453 by Chloe Branham RN  Outcome: Progressing     Problem: ABCDS Injury Assessment  Goal: Absence of physical injury  8/7/2022 1453 by Chloe Branham RN  Outcome: Progressing  8/7/2022 1453 by Chloe Branham RN  Outcome: Progressing     Problem: Skin/Tissue Integrity  Goal: Absence of new skin breakdown  Description: 1. Monitor for areas of redness and/or skin breakdown  2. Assess vascular access sites hourly  3. Every 4-6 hours minimum:  Change oxygen saturation probe site  4. Every 4-6 hours:  If on nasal continuous positive airway pressure, respiratory therapy assess nares and determine need for appliance change or resting period. 8/7/2022 1453 by Chloe Branham RN  Outcome: Progressing  8/7/2022 1453 by Chloe Branham RN  Outcome: Progressing     Problem: Pain  Goal: Verbalizes/displays adequate comfort level or baseline comfort level  8/7/2022 1453 by Chloe Branham RN  Outcome: Progressing  8/7/2022 1453 by Chloe Branham RN  Outcome: Progressing     Problem: Anxiety  Goal: Will report anxiety at manageable levels  Description: INTERVENTIONS:  1. Administer medication as ordered  2. Teach and rehearse alternative coping skills  3.  Provide emotional support with 1:1 interaction with staff  8/7/2022 1453 by Chloe Branham RN  Outcome: Progressing  8/7/2022 1453 by Chloe Branham RN  Outcome: Progressing     Problem: Chronic Conditions and Co-morbidities  Goal: Patient's chronic conditions and co-morbidity symptoms are monitored and maintained or improved  8/7/2022 1453 by Chloe Branham RN  Outcome: Progressing  8/7/2022 1453 by Chloe Branham RN  Outcome: Progressing  Flowsheets (Taken 8/7/2022 0200 by Abhay Dumont RN)  Care Plan - Patient's Chronic Conditions and Co-Morbidity Symptoms are Monitored and Maintained or Improved:   Monitor and assess patient's chronic conditions and comorbid symptoms for stability, deterioration, or improvement   Collaborate with multidisciplinary team to address chronic and comorbid conditions and prevent exacerbation or deterioration     Problem: Discharge Planning  Goal: Discharge to home or other facility with appropriate resources  8/7/2022 1453 by Tracey Franco RN  Outcome: Progressing  8/7/2022 1453 by Tracey Franco RN  Outcome: Progressing  Flowsheets (Taken 8/7/2022 0200 by Larissa Owens RN)  Discharge to home or other facility with appropriate resources:   Identify barriers to discharge with patient and caregiver   Arrange for needed discharge resources and transportation as appropriate

## 2022-08-08 PROBLEM — U07.1 COVID-19: Status: ACTIVE | Noted: 2022-08-08

## 2022-08-08 LAB
ANION GAP SERPL CALCULATED.3IONS-SCNC: 9 MMOL/L (ref 7–16)
BASOPHILS ABSOLUTE: 0.08 E9/L (ref 0–0.2)
BASOPHILS RELATIVE PERCENT: 0.6 % (ref 0–2)
BUN BLDV-MCNC: 33 MG/DL (ref 6–23)
BURR CELLS: ABNORMAL
CALCIUM SERPL-MCNC: 9.1 MG/DL (ref 8.6–10.2)
CHLORIDE BLD-SCNC: 102 MMOL/L (ref 98–107)
CO2: 22 MMOL/L (ref 22–29)
CREAT SERPL-MCNC: 0.8 MG/DL (ref 0.5–1)
EOSINOPHILS ABSOLUTE: 0.17 E9/L (ref 0.05–0.5)
EOSINOPHILS RELATIVE PERCENT: 1.2 % (ref 0–6)
GFR AFRICAN AMERICAN: >60
GFR NON-AFRICAN AMERICAN: >60 ML/MIN/1.73
GLUCOSE BLD-MCNC: 116 MG/DL (ref 74–99)
HCT VFR BLD CALC: 31.7 % (ref 34–48)
HEMOGLOBIN: 10.5 G/DL (ref 11.5–15.5)
IMMATURE GRANULOCYTES #: 0.06 E9/L
IMMATURE GRANULOCYTES %: 0.4 % (ref 0–5)
INFLUENZA A: NOT DETECTED
INFLUENZA B: NOT DETECTED
LYMPHOCYTES ABSOLUTE: 1.11 E9/L (ref 1.5–4)
LYMPHOCYTES RELATIVE PERCENT: 8 % (ref 20–42)
MAGNESIUM: 1.9 MG/DL (ref 1.6–2.6)
MCH RBC QN AUTO: 31.9 PG (ref 26–35)
MCHC RBC AUTO-ENTMCNC: 33.1 % (ref 32–34.5)
MCV RBC AUTO: 96.4 FL (ref 80–99.9)
MONOCYTES ABSOLUTE: 1.73 E9/L (ref 0.1–0.95)
MONOCYTES RELATIVE PERCENT: 12.4 % (ref 2–12)
NEUTROPHILS ABSOLUTE: 10.75 E9/L (ref 1.8–7.3)
NEUTROPHILS RELATIVE PERCENT: 77.4 % (ref 43–80)
OVALOCYTES: ABNORMAL
PDW BLD-RTO: 13 FL (ref 11.5–15)
PLATELET # BLD: 216 E9/L (ref 130–450)
PMV BLD AUTO: 10.9 FL (ref 7–12)
POIKILOCYTES: ABNORMAL
POLYCHROMASIA: ABNORMAL
POTASSIUM SERPL-SCNC: 4.4 MMOL/L (ref 3.5–5)
RBC # BLD: 3.29 E12/L (ref 3.5–5.5)
SARS-COV-2 RNA, RT PCR: DETECTED
SARS-COV-2, NAAT: DETECTED
SODIUM BLD-SCNC: 133 MMOL/L (ref 132–146)
WBC # BLD: 13.9 E9/L (ref 4.5–11.5)

## 2022-08-08 PROCEDURE — 6370000000 HC RX 637 (ALT 250 FOR IP): Performed by: ORTHOPAEDIC SURGERY

## 2022-08-08 PROCEDURE — 87635 SARS-COV-2 COVID-19 AMP PRB: CPT

## 2022-08-08 PROCEDURE — 6370000000 HC RX 637 (ALT 250 FOR IP): Performed by: NURSE PRACTITIONER

## 2022-08-08 PROCEDURE — 83735 ASSAY OF MAGNESIUM: CPT

## 2022-08-08 PROCEDURE — 80048 BASIC METABOLIC PNL TOTAL CA: CPT

## 2022-08-08 PROCEDURE — 87636 SARSCOV2 & INF A&B AMP PRB: CPT

## 2022-08-08 PROCEDURE — 1200000000 HC SEMI PRIVATE

## 2022-08-08 PROCEDURE — 2580000003 HC RX 258: Performed by: FAMILY MEDICINE

## 2022-08-08 PROCEDURE — 36415 COLL VENOUS BLD VENIPUNCTURE: CPT

## 2022-08-08 PROCEDURE — 6360000002 HC RX W HCPCS: Performed by: ORTHOPAEDIC SURGERY

## 2022-08-08 PROCEDURE — 97530 THERAPEUTIC ACTIVITIES: CPT

## 2022-08-08 PROCEDURE — 6370000000 HC RX 637 (ALT 250 FOR IP): Performed by: INTERNAL MEDICINE

## 2022-08-08 PROCEDURE — 85025 COMPLETE CBC W/AUTO DIFF WBC: CPT

## 2022-08-08 RX ORDER — LISINOPRIL 40 MG/1
40 TABLET ORAL DAILY
Qty: 30 TABLET | Refills: 3 | DISCHARGE
Start: 2022-08-09 | End: 2022-09-27

## 2022-08-08 RX ORDER — POLYETHYLENE GLYCOL 3350 17 G/17G
17 POWDER, FOR SOLUTION ORAL DAILY
Qty: 527 G | Refills: 1 | DISCHARGE
Start: 2022-08-09 | End: 2022-09-08

## 2022-08-08 RX ORDER — AMLODIPINE BESYLATE 5 MG/1
5 TABLET ORAL DAILY
Qty: 30 TABLET | Refills: 3 | DISCHARGE
Start: 2022-08-09 | End: 2022-09-27

## 2022-08-08 RX ORDER — FUROSEMIDE 20 MG/1
20 TABLET ORAL EVERY OTHER DAY
Qty: 60 TABLET | Refills: 3 | DISCHARGE
Start: 2022-08-08 | End: 2022-09-27

## 2022-08-08 RX ORDER — PSEUDOEPHEDRINE HCL 30 MG
100 TABLET ORAL 2 TIMES DAILY
DISCHARGE
Start: 2022-08-08 | End: 2022-09-27

## 2022-08-08 RX ADMIN — DOCUSATE SODIUM 100 MG: 100 CAPSULE, LIQUID FILLED ORAL at 09:11

## 2022-08-08 RX ADMIN — APIXABAN 2.5 MG: 5 TABLET, FILM COATED ORAL at 09:11

## 2022-08-08 RX ADMIN — DOCUSATE SODIUM 100 MG: 100 CAPSULE, LIQUID FILLED ORAL at 22:24

## 2022-08-08 RX ADMIN — POLYETHYLENE GLYCOL 3350 17 G: 17 POWDER, FOR SOLUTION ORAL at 09:11

## 2022-08-08 RX ADMIN — HYDROCODONE BITARTRATE AND ACETAMINOPHEN 1 TABLET: 5; 325 TABLET ORAL at 14:08

## 2022-08-08 RX ADMIN — Medication 10 ML: at 09:12

## 2022-08-08 RX ADMIN — Medication 10 ML: at 21:00

## 2022-08-08 RX ADMIN — MORPHINE SULFATE 2 MG: 2 INJECTION, SOLUTION INTRAMUSCULAR; INTRAVENOUS at 22:19

## 2022-08-08 RX ADMIN — APIXABAN 2.5 MG: 5 TABLET, FILM COATED ORAL at 22:24

## 2022-08-08 RX ADMIN — LISINOPRIL 40 MG: 20 TABLET ORAL at 09:11

## 2022-08-08 RX ADMIN — AMLODIPINE BESYLATE 5 MG: 5 TABLET ORAL at 09:11

## 2022-08-08 ASSESSMENT — PAIN DESCRIPTION - LOCATION
LOCATION: HIP
LOCATION: HIP

## 2022-08-08 ASSESSMENT — ENCOUNTER SYMPTOMS: TACHYPNEA: 1

## 2022-08-08 ASSESSMENT — PAIN SCALES - GENERAL
PAINLEVEL_OUTOF10: 9
PAINLEVEL_OUTOF10: 1
PAINLEVEL_OUTOF10: 10
PAINLEVEL_OUTOF10: 10

## 2022-08-08 ASSESSMENT — PAIN DESCRIPTION - ORIENTATION
ORIENTATION: LEFT
ORIENTATION: LEFT

## 2022-08-08 ASSESSMENT — PAIN DESCRIPTION - DESCRIPTORS
DESCRIPTORS: ACHING
DESCRIPTORS: ACHING

## 2022-08-08 NOTE — PROGRESS NOTES
depressed. Musculoskeletal:    Denies  myalgias, joint complaints or back pain. Surgical changes to the left lower extremity. Integumentary:   Denies any rashes, ulcers, or excoriations. Denies bruising. Hematologic/Lymphatic:  Denies bruising or bleeding. Physical Exam:  No intake/output data recorded. Intake/Output Summary (Last 24 hours) at 8/8/2022 1422  Last data filed at 8/8/2022 0345  Gross per 24 hour   Intake 360 ml   Output 100 ml   Net 260 ml   I/O last 3 completed shifts: In: 600 [P.O.:600]  Out: 1050 [Urine:1050]  Patient Vitals for the past 96 hrs (Last 3 readings):   Weight   08/07/22 0600 121 lb (54.9 kg)   08/06/22 0648 125 lb (56.7 kg)   08/05/22 0546 120 lb (54.4 kg)     Vital Signs:   Blood pressure (!) 182/82, pulse 76, temperature 97.6 °F (36.4 °C), temperature source Oral, resp. rate 18, height 5' 5\" (1.651 m), weight 121 lb (54.9 kg), SpO2 97 %. General appearance:  Alert, responsive, oriented to person, place, and time. Well preserved, alert, no distress. Head:  Normocephalic. No masses, lesions or tenderness. Eyes:  PERRLA. EOMI. Sclera clear. ENT:  Ears normal. Mucosa normal. Missing teeth  Neck:    Supple. Trachea midline. No thyromegaly. No JVD. No bruits. Heart:    Irregularly irregular rate and rhythm. Atrial fibrillation. No murmurs. Lungs:    Symmetrical. Clear to auscultation bilaterally. No wheezes. No rhonchi. No rales. Abdomen:   Soft. Non-tender. Non-distended. Bowel sounds positive. No organomegaly or masses. No pain on palpation. Extremities:    Peripheral pulses present. No peripheral edema. No ulcers. No cyanosis. No clubbing. Neurologic:    Alert x 3. No focal deficit. Cranial nerves grossly intact. No focal weakness. Psych:   Behavior is normal. Mood appears normal. Speech is not rapid and/or pressured. Musculoskeletal:   Spine ROM normal. Muscular strength intact. Gait not assessed.   Surgical changes left lower spent with the family or surrogate(s) is included only if the patient was incapable of providing the necessary information or participating in medical decisions. I also discussed the differential diagnosis and all of the proposed management plans with the patient and individuals accompanying the patient.         Marcus Flores DO, D.O., FACOI  2:22 PM  8/8/2022

## 2022-08-08 NOTE — PROGRESS NOTES
Physical Therapy    Physical Therapy Treatment Note/Plan of Care    Room #:  7041/9218-60  Patient Name: Jovany Yi  YOB: 1931  MRN: [id number]    Date of Service: 8/8/2022     Tentative placement recommendation:  inpatient rehab  Equipment recommendation: To be determined      Evaluating Physical Therapist: Jumana Sprague  #[id number]     Specific Provider Orders/Date/Referring Provider :  08/02/22 2145    PT eval and treat  Start:  08/02/22 2145,   End:  08/02/22 2145,   ONE TIME,   Standing Count:  1 Occurrences,   R          Jayleen Chavis DO     Admitting Diagnosis:   Essential hypertension [I10]  New onset atrial fibrillation ([facility] [I48.91]  Subcapital fracture of neck of femur, left, closed, initial encounter ([address] Rubina Ling  Urinary tract infection without hematuria, site unspecified [N39.0]   Visit diagnosis: Admitted with    with left hip pain /fracture, new onset a fib  Visit Diagnoses         Codes    New onset atrial fibrillation ([address]    -  Primary I48.91    Essential hypertension     I10    Urinary tract infection without hematuria, site unspecified     N39.0    S/p left hip fracture     Z87.81          Surgery:     PROVIDER:     Nicky Dickson  DATE OF PROCEDURE:  08/04/2022  PREOPERATIVE DIAGNOSIS:  Closed, displaced subcapital fracture, left hip. POSTOPERATIVE DIAGNOSIS:  Closed, displaced subcapital fracture, left hip. OPERATION:  Cementless left hip replacement. Patient Active Problem List   Diagnosis    Vitamin D deficiency    Diet-controlled diabetes mellitus ([address]    History of hypothyroidism    Subcapital fracture of neck of femur, left, closed, initial encounter ([address]    Femur fracture ([address]    New onset atrial fibrillation ([address]       ASSESSMENT of current deficits: Patient exhibits decreased strength, balance, endurance, range of motion, coordination, and pain . l hip   impairing functional mobility, transfers, gait , gait distance, and tolerance to activity are barriers to d/c and require skilled intervention during hospital stay to attain pre hospital level of function. Decreased strength, balance and endurance  increases patient's risk for fall. Short term memory deficit. Patient required Max assist to edge of bed, tolerates standing x3 reps with cues for hand placement. Patient incontinent on standing, depends donned. Patient took steps to chair. Very distractible with cues needed to stay on task. Patient would benefit from continued therapy for strengthening and inc mobility to decrease risk of falls. PHYSICAL THERAPY  PLAN OF CARE       Physical therapy plan of care is established based on physician order,  patient diagnosis and clinical assessment    Current Treatment Recommendations:    -Bed Mobility: Lower extremity exercises   -Standing Balance: Perform strengthening exercises in standing to promote motor control with or without upper extremity support   -Transfers: Provide instruction on proper hand and foot position for adequate transfer of weight onto lower extremities and use of gait device if needed and Cues for hand placement, technique and safety. Provide stabilization to prevent fall   -Gait: Gait training and Standing activities to improve: base of support, weight shift, weight bearing    -Endurance: Utilize Supervised activities to increase level of endurance to allow for safe functional mobility including transfers and gait     PT long term treatment goals are located in below grid    Patient and or family understand(s) diagnosis, prognosis, and plan of care. Frequency of treatments: Patient will be seen  twice daily  for therapeutic exercise, functional retraining, endurance activities, balance exercises, family and patient education.        Prior Level of Function: Patient ambulated independently    Rehab Potential: good   for baseline    Past medical history:   Past Medical History:   Diagnosis Date    Arthritis     right knee Diabetes mellitus ([facility] [address]     Hyperthyroidism      Past Surgical History:   Procedure Laterality Date    CATARACT REMOVAL WITH IMPLANT  2008    KNEE ARTHROSCOPY  2005    right    TONSILLECTOMY           SUBJECTIVE:    Precautions: Activity as tolerated and Ambulate patient , falls and alarm ,left lower extremity FWB (full weight bearing)     Social history: Patient lives alone in a ranch home  with 2 steps  to enter with [employer] available at d/c    Equipment owned: Toilet riser,       AM-PAC Basic Mobility        AM-[facility] Mobility Inpatient   How much difficulty turning over in bed?: A Little  How much difficulty sitting down on / standing up from a chair with arms?: A Lot  How much difficulty moving from lying on back to sitting on side of bed?: A Lot  How much help from another person moving to and from a bed to a chair?: A Lot  How much help from another person needed to walk in hospital room?: A Lot  How much help from another person for climbing 3-5 steps with a railing?: Total  AM-PAC Inpatient Mobility Raw Score : 12  AM-PAC Inpatient T-Scale Score : 35.33  Mobility Inpatient CMS 0-100% Score: 68.66  Mobility Inpatient CMS G-Code Modifier : CL    Nursing cleared patient for PT treatment. OBJECTIVE:   Initial Evaluation  Date: 8/4/2022 Treatment Date:  8/8/2022  Short Term/ Long Term   Goals   Was pt agreeable to Eval/treatment? Yes yes    Pain Level  8/10   Left hip   No number given      Bed Mobility  Rolling: Maximal assist of 1    Supine to sit: Maximal assist of 1    Sit to supine: Not assessed patient in chair    Scooting: Maximal assist of 1   Rolling: Minimal assist of 1   Supine to sit: Maximal assist of 1   Sit to supine: Not assessed patient in chair   Scooting: Moderate assist of 1   Rolling: Independent    Supine to sit: Independent    Sit to supine: Independent    Scooting: Independent     Transfers Sit to stand: Moderate assist of 1   Sit to stand:  Moderate assist of 1 cues for hand placement    Sit to stand: Modified Independent     Ambulation     6 forward/backward steps using  wheeled walker with Moderate assist of  2   for walker control, balance, multiplane instability, and safety, Patient with antalgic gait left lower extremity and internal rotation, and cues for sequencing, walker approximation, safety, and proper hand placement  upright posture 3-4 steps using  wheeled walker with Moderate assist of 1   for safety and  for walker approximation, upright, Right lower extremity progression, and safety. 100 feet using  wheeled walker with Modified Independent    ROM Within functional limits except Left hip within precautions     Increase range of motion 10% of affected joints    Strength Within functional limits  except  Left lower extremity 2-/5  Within functional limits   Balance Sitting EOB:  fair    Dynamic Standing:  poor  wheeled walker  Sitting EOB: fair  +  Dynamic Standing: fair   with wheeled walker  Sitting EOB:  good    Dynamic Standing:  good wheeled walker      Patient is Alert & Oriented x person and place and follows one step directions    Sensation:  Patient denies numbness/tingling  Edema:  yes left lower extremity        Patient education  Patient educated on role of Physical Therapy, risks of immobility, safety and plan of care,  importance of mobility while in hospital , importance and purpose of adaptive device and adjusted to proper height for the patient. , hip precautions, safety , weight bearing status , proper use/technique of incentive spirometer, and seated exercises       Patient response to education:   Pt verbalized understanding Pt demonstrated skill Pt requires further education in this area   Yes Partial Yes       Treatment:  Patient practiced and was instructed in the following treatment:     Therapist educated and facilitated patient on techniques to increase safety and independence with bed mobility, balance, functional transfers, and functional mobility. Patient assisted with rolling in bed to replace pads. Patient assisted to edge of bed and sat x5min. Patient stood, incontinent of urine, sat down and assisted with donning depends. Patient stood to take steps to bedside chair. Patient did small stand to replace purewick. At end of session, patient in chair with daughter present and lunch tray  call light and phone within reach,   all lines and tubes intact, nursing notified. Patient would benefit from continued skilled Physical Therapy to improve functional independence and quality of life. Patient's/ family goals   rehab      Patient and or family understand(s) diagnosis, prognosis, and plan of care. Frequency of treatments: Patient will be seen  twice daily  for therapeutic exercise, functional retraining, endurance activities, balance exercises, family and patient education.      Time in  1106  Time out  1133    Total Treatment Time  27 minutes     CPT codes:   Therapeutic activities (94738)   27 minutes  2 unit(s)     Paola Silver PTA  T#[id number]

## 2022-08-08 NOTE — PLAN OF CARE
Problem: Safety - Adult  Goal: Free from fall injury  8/7/2022 2201 by Carmen Dan RN  Outcome: Progressing  8/7/2022 1453 by Padmini Berman RN  Outcome: Progressing  8/7/2022 1453 by Padmini Berman RN  Outcome: Progressing     Problem: ABCDS Injury Assessment  Goal: Absence of physical injury  8/7/2022 2201 by Carmen Dan RN  Outcome: Progressing  8/7/2022 1453 by Padmini Berman RN  Outcome: Progressing  8/7/2022 1453 by Padmini Berman RN  Outcome: Progressing     Problem: Skin/Tissue Integrity  Goal: Absence of new skin breakdown  Description: 1. Monitor for areas of redness and/or skin breakdown  2. Assess vascular access sites hourly  3. Every 4-6 hours minimum:  Change oxygen saturation probe site  4. Every 4-6 hours:  If on nasal continuous positive airway pressure, respiratory therapy assess nares and determine need for appliance change or resting period. 8/7/2022 2201 by Carmen Dan RN  Outcome: Progressing  8/7/2022 1453 by Padmini Berman RN  Outcome: Progressing  8/7/2022 1453 by Pdamini Berman RN  Outcome: Progressing     Problem: Pain  Goal: Verbalizes/displays adequate comfort level or baseline comfort level  8/7/2022 1453 by Padmini Berman RN  Outcome: Progressing  8/7/2022 1453 by Padmini Berman RN  Outcome: Progressing     Problem: Anxiety  Goal: Will report anxiety at manageable levels  Description: INTERVENTIONS:  1. Administer medication as ordered  2. Teach and rehearse alternative coping skills  3.  Provide emotional support with 1:1 interaction with staff  8/7/2022 1453 by Padmini Berman RN  Outcome: Progressing  8/7/2022 1453 by Padmini Berman RN  Outcome: Progressing     Problem: Chronic Conditions and Co-morbidities  Goal: Patient's chronic conditions and co-morbidity symptoms are monitored and maintained or improved  8/7/2022 1453 by Padmini Berman RN  Outcome: Progressing  8/7/2022 1453 by Padmini Berman RN  Outcome: Progressing  Flowsheets (Taken 8/7/2022 0200 by Sebastian Chen, RN)  Care Plan - Patient's Chronic Conditions and Co-Morbidity Symptoms are Monitored and Maintained or Improved:   Monitor and assess patient's chronic conditions and comorbid symptoms for stability, deterioration, or improvement   Collaborate with multidisciplinary team to address chronic and comorbid conditions and prevent exacerbation or deterioration     Problem: Discharge Planning  Goal: Discharge to home or other facility with appropriate resources  8/7/2022 1453 by Javier Peters RN  Outcome: Progressing  8/7/2022 1453 by Javier Peters RN  Outcome: Progressing  Flowsheets (Taken 8/7/2022 0200 by Sebastian Chen, RN)  Discharge to home or other facility with appropriate resources:   Identify barriers to discharge with patient and caregiver   Arrange for needed discharge resources and transportation as appropriate

## 2022-08-08 NOTE — CARE COORDINATION
Patients rapid covid came back positive. Have spoken with Cooper Lira at [facility], they can not accept covid positive. She states if a PCR test comes back negative they can still accept. CM spoke with Dr Maria Elena Pizarro for PCR test, await result. Daughter is currently out of room, she had a 2:30 appointment and is planning to return after that. Will attempt to reach her for alternative choices if PCR test is positive.

## 2022-08-08 NOTE — PROGRESS NOTES
Department of Orthopedic Surgery  Resident Progress Note    Patient seen and examined. Pain is very well controlled, no complaints this morning.   VITALS:  BP (!) 148/67   Pulse 60   Temp 98.5 °F (36.9 °C) (Oral)   Resp 16   Ht 5' 5\" (1.651 m)   Wt 121 lb (54.9 kg)   SpO2 98%   BMI 20.14 kg/m²     General: Awake and alert, confused, no apparent distress    MUSCULOSKELETAL:   left lower extremity:  Dressing C/D/I  Compartments soft and compressible  +PF/DF/EHL  +2/4 DP & PT pulses, Brisk Cap refill, Toes warm and perfused  Distal sensation grossly intact to Peroneals, Sural, Saphenous, and tibial nrs    CBC:   Lab Results   Component Value Date/Time    WBC 13.9 08/08/2022 05:15 AM    HGB 10.5 08/08/2022 05:15 AM    HCT 31.7 08/08/2022 05:15 AM     08/08/2022 05:15 AM     PT/INR:  No results found for: PROTIME, INR      ASSESSMENT  S/P left hip hemiarthroplasty on 8/4 for left displaced femoral neck fracture    PLAN      Continue physical therapy and protocol: WBAT -left LE  24 hour abx coverage-completed  Deep venous thrombosis prophylaxis -Eliquis  PT/OT  Pain Control: IV and PO  Monitor H&H  D/C Plan: Appreciate PT/OT/SW recommendations, from an orthopedic standpoint once patient has an appropriate plan in place she can be discharged, current plan is [facility]

## 2022-08-08 NOTE — CARE COORDINATION
Patient covid positive now, PCR test also done and was positive. Cancelled DC to [facility] as they can not take now. Have spoken with son, Sharad Lambert at bedside for alternate choices that accept covid. He states will have to speak with daughter, James Briceno. Call placed to [facility], she did not answer. SW made tentative referrals to [facility] Franky Doan), [facility] and [facility]) as those facilities accept Covid Positive, will await acceptance and talk to daughter about final choice.

## 2022-08-08 NOTE — CARE COORDINATION
Notified that patient ok to discharge to [facility] today, later this afternoon. Call placed to [facility] and arranged transport for 4p. Notified daughter, Jillian Juarez who is at bedside. LEI needs signed by Dr. Monica Fletcher form completed and placed on soft chart. HENS started, needs completed with DC order.       [facility]  N/N [address] [facility] [phone]

## 2022-08-09 VITALS
RESPIRATION RATE: 16 BRPM | TEMPERATURE: 97.7 F | HEIGHT: 65 IN | OXYGEN SATURATION: 97 % | BODY MASS INDEX: 21.71 KG/M2 | HEART RATE: 72 BPM | DIASTOLIC BLOOD PRESSURE: 81 MMHG | WEIGHT: 130.31 LBS | SYSTOLIC BLOOD PRESSURE: 172 MMHG

## 2022-08-09 LAB
ANION GAP SERPL CALCULATED.3IONS-SCNC: 11 MMOL/L (ref 7–16)
BASOPHILS ABSOLUTE: 0 E9/L (ref 0–0.2)
BASOPHILS RELATIVE PERCENT: 0.7 % (ref 0–2)
BUN BLDV-MCNC: 31 MG/DL (ref 6–23)
BURR CELLS: ABNORMAL
CALCIUM SERPL-MCNC: 9.1 MG/DL (ref 8.6–10.2)
CHLORIDE BLD-SCNC: 102 MMOL/L (ref 98–107)
CO2: 23 MMOL/L (ref 22–29)
CREAT SERPL-MCNC: 0.6 MG/DL (ref 0.5–1)
EOSINOPHILS ABSOLUTE: 0 E9/L (ref 0.05–0.5)
EOSINOPHILS RELATIVE PERCENT: 1 % (ref 0–6)
GFR AFRICAN AMERICAN: >60
GFR NON-AFRICAN AMERICAN: >60 ML/MIN/1.73
GLUCOSE BLD-MCNC: 112 MG/DL (ref 74–99)
HCT VFR BLD CALC: 30.1 % (ref 34–48)
HEMOGLOBIN: 10.3 G/DL (ref 11.5–15.5)
LYMPHOCYTES ABSOLUTE: 0.4 E9/L (ref 1.5–4)
LYMPHOCYTES RELATIVE PERCENT: 4.3 % (ref 20–42)
MAGNESIUM: 1.8 MG/DL (ref 1.6–2.6)
MCH RBC QN AUTO: 32 PG (ref 26–35)
MCHC RBC AUTO-ENTMCNC: 34.2 % (ref 32–34.5)
MCV RBC AUTO: 93.5 FL (ref 80–99.9)
MONOCYTES ABSOLUTE: 1.6 E9/L (ref 0.1–0.95)
MONOCYTES RELATIVE PERCENT: 15.7 % (ref 2–12)
NEUTROPHILS ABSOLUTE: 8 E9/L (ref 1.8–7.3)
NEUTROPHILS RELATIVE PERCENT: 80 % (ref 43–80)
OVALOCYTES: ABNORMAL
PDW BLD-RTO: 13 FL (ref 11.5–15)
PLATELET # BLD: 227 E9/L (ref 130–450)
PMV BLD AUTO: 10.4 FL (ref 7–12)
POIKILOCYTES: ABNORMAL
POLYCHROMASIA: ABNORMAL
POTASSIUM SERPL-SCNC: 4.3 MMOL/L (ref 3.5–5)
RBC # BLD: 3.22 E12/L (ref 3.5–5.5)
SODIUM BLD-SCNC: 136 MMOL/L (ref 132–146)
WBC # BLD: 10 E9/L (ref 4.5–11.5)

## 2022-08-09 PROCEDURE — 2580000003 HC RX 258: Performed by: FAMILY MEDICINE

## 2022-08-09 PROCEDURE — 36415 COLL VENOUS BLD VENIPUNCTURE: CPT

## 2022-08-09 PROCEDURE — 6360000002 HC RX W HCPCS: Performed by: ORTHOPAEDIC SURGERY

## 2022-08-09 PROCEDURE — 80048 BASIC METABOLIC PNL TOTAL CA: CPT

## 2022-08-09 PROCEDURE — 6370000000 HC RX 637 (ALT 250 FOR IP): Performed by: ORTHOPAEDIC SURGERY

## 2022-08-09 PROCEDURE — 97535 SELF CARE MNGMENT TRAINING: CPT

## 2022-08-09 PROCEDURE — 85025 COMPLETE CBC W/AUTO DIFF WBC: CPT

## 2022-08-09 PROCEDURE — 83735 ASSAY OF MAGNESIUM: CPT

## 2022-08-09 PROCEDURE — 6370000000 HC RX 637 (ALT 250 FOR IP): Performed by: NURSE PRACTITIONER

## 2022-08-09 PROCEDURE — 6370000000 HC RX 637 (ALT 250 FOR IP): Performed by: INTERNAL MEDICINE

## 2022-08-09 PROCEDURE — 6370000000 HC RX 637 (ALT 250 FOR IP): Performed by: SPECIALIST

## 2022-08-09 RX ORDER — ZINC GLUCONATE 50 MG
50 TABLET ORAL DAILY
Qty: 30 TABLET | Refills: 3 | DISCHARGE
Start: 2022-08-09 | End: 2022-09-27

## 2022-08-09 RX ORDER — ZINC SULFATE 50(220)MG
50 CAPSULE ORAL DAILY
Status: DISCONTINUED | OUTPATIENT
Start: 2022-08-09 | End: 2022-08-09 | Stop reason: HOSPADM

## 2022-08-09 RX ORDER — VITAMIN B COMPLEX
1000 TABLET ORAL DAILY
Status: DISCONTINUED | OUTPATIENT
Start: 2022-08-09 | End: 2022-08-09 | Stop reason: HOSPADM

## 2022-08-09 RX ORDER — HYDROCODONE BITARTRATE AND ACETAMINOPHEN 5; 325 MG/1; MG/1
1 TABLET ORAL EVERY 6 HOURS PRN
Qty: 12 TABLET | Refills: 0 | Status: SHIPPED | OUTPATIENT
Start: 2022-08-09 | End: 2022-08-12

## 2022-08-09 RX ORDER — ASCORBIC ACID 500 MG
500 TABLET ORAL DAILY
Status: DISCONTINUED | OUTPATIENT
Start: 2022-08-09 | End: 2022-08-09 | Stop reason: HOSPADM

## 2022-08-09 RX ADMIN — Medication 10 ML: at 09:26

## 2022-08-09 RX ADMIN — AMLODIPINE BESYLATE 5 MG: 5 TABLET ORAL at 09:27

## 2022-08-09 RX ADMIN — APIXABAN 2.5 MG: 5 TABLET, FILM COATED ORAL at 09:27

## 2022-08-09 RX ADMIN — Medication 1000 UNITS: at 09:27

## 2022-08-09 RX ADMIN — POLYETHYLENE GLYCOL 3350 17 G: 17 POWDER, FOR SOLUTION ORAL at 09:26

## 2022-08-09 RX ADMIN — OXYCODONE HYDROCHLORIDE AND ACETAMINOPHEN 500 MG: 500 TABLET ORAL at 09:27

## 2022-08-09 RX ADMIN — LISINOPRIL 40 MG: 20 TABLET ORAL at 09:27

## 2022-08-09 RX ADMIN — DOCUSATE SODIUM 100 MG: 100 CAPSULE, LIQUID FILLED ORAL at 09:27

## 2022-08-09 RX ADMIN — ZINC SULFATE 220 MG (50 MG) CAPSULE 50 MG: CAPSULE at 09:27

## 2022-08-09 RX ADMIN — MORPHINE SULFATE 4 MG: 4 INJECTION, SOLUTION INTRAMUSCULAR; INTRAVENOUS at 05:53

## 2022-08-09 ASSESSMENT — PAIN DESCRIPTION - LOCATION
LOCATION: LEG
LOCATION: HIP;LEG

## 2022-08-09 ASSESSMENT — PAIN DESCRIPTION - DESCRIPTORS
DESCRIPTORS: ACHING;DISCOMFORT;SORE
DESCRIPTORS: ACHING;SORE;DISCOMFORT

## 2022-08-09 ASSESSMENT — PAIN DESCRIPTION - ORIENTATION
ORIENTATION: LEFT
ORIENTATION: LEFT

## 2022-08-09 ASSESSMENT — PAIN SCALES - GENERAL
PAINLEVEL_OUTOF10: 9
PAINLEVEL_OUTOF10: 5

## 2022-08-09 ASSESSMENT — PAIN - FUNCTIONAL ASSESSMENT: PAIN_FUNCTIONAL_ASSESSMENT: PREVENTS OR INTERFERES WITH MANY ACTIVE NOT PASSIVE ACTIVITIES

## 2022-08-09 NOTE — PROGRESS NOTES
OT BEDSIDE TREATMENT NOTE      Date:2022  Patient Name: Chrystal Heard  MRN: [id number]  : 1931  Room: [address] [place] [address]       Evaluating OT: Sheila Anguiano OTR/L #[id number]      Referring Provider and Specific Provider Orders/Date:      22   OT eval and treat  Start:  22,   End:  22,   ONE TIME,   Standing Count:  1 Occurrences,   R         Rebecca Ruth, DO       Placement Recommendation: Inpatient Rehab         Diagnosis:   1. New onset atrial fibrillation (HCC)   2. Subcapital fracture of neck of femur, left, closed, initial encounter ([facility] [address]   3. Essential hypertension   4. Urinary tract infection without hematuria, site unspecified   5. S/p left hip fracture         Surgery: S/p cementless left hip replacement 22 by Dr. Fantasma Castillo        Pertinent Medical History:       Past Medical History        Past Medical History:   Diagnosis Date    Arthritis       right knee    Diabetes mellitus ([facility] [address]      Hyperthyroidism              Past Surgical History         Past Surgical History:   Procedure Laterality Date    CATARACT REMOVAL WITH IMPLANT       KNEE ARTHROSCOPY        right    TONSILLECTOMY                 Precautions:  Fall Risk, falls and alarm, up with assistance, full weight bearing left LE, hip precautions, safety, incontinence.        Assessment of current deficits   [x] Functional mobility            [x]ADLs           [x] Strength                   [x]Cognition   [x] Functional transfers          [x] IADLs          [x] Safety Awareness   [x]Endurance   [] Fine Coordination              [x] Balance      [] Vision/perception    []Sensation      []Gross Motor Coordination  [] ROM           [] Delirium                   [] Motor Control     OT PLAN OF CARE   OT POC based on physician orders, patient diagnosis and results of clinical assessment     Frequency/Duration 2-5 days/wk for 2 weeks BID PRN     Specific OT Treatment Interventions to include:   * Instruction/training on adapted ADL techniques and AE recommendations to increase functional independence within precautions       * Training on energy conservation strategies, correct breathing pattern and techniques to improve independence/tolerance for self-care routine  * Functional transfer/mobility training/DME recommendations for increased independence, safety, and fall prevention  * Patient/Family education to increase follow through with safety techniques and functional independence  * Recommendation of environmental modifications for increased safety with functional transfers/mobility and ADLs  * Cognitive retraining/development of therapeutic activities to improve problem solving, judgement, memory, and attention for increased safety/participation in ADL/IADL tasks  * Therapeutic exercise to improve motor endurance, ROM, and functional strength for ADLs/functional transfers  * Therapeutic activities to facilitate/challenge dynamic balance, stand tolerance for increased safety and independence with ADLs  * Positioning to improve skin integrity, interaction with environment and functional independence     Recommended Adaptive Equipment: TBD      Home Living: Lives alone, single family home, 1 story with basement dwelling, steps to enter ? Bathroom set-up: Tub/shower, elevated commode. Equipment owned: wheeled walker      Prior Level of Function: Independent with ADLs , children assist with IADLs; ambulated independently. Driving: Yes      Pain Level: Pt reports mild pain in left LE with activity; Nursing notified.           Cognition: A&O: 3/4; Follows 1-2 step directions - requires cues for re-direction and attention to task              Memory: fair              Sequencing: fair               Problem solving: fair  minus               Judgement/safety: fair  minus      AMPAC   AM-PAC Daily Activity Inpatient  How much help for putting on and taking off regular lower body clothing?: Total  How much help for Bathing?: A Lot  How much help for Toileting?: Total  How much help for putting on and taking off regular upper body clothing?: A Little  How much help for taking care of personal grooming?: A Little  How much help for eating meals?: A Little  AM-PAC Inpatient Daily Activity Raw Score: 13                Functional Assessment:    Initial Eval Status  Date: 8/5/22    Treatment Status  Date:8/9/22 STGs = LTGs  Time frame: 10-14 days   Feeding Supervision    Supervision; pt able to bring cup to mouth to take a drink of water x 3 reps during session  Independent    Grooming Moderate Assist    Pt able to brush hair, wash face, manage containers and complete oral hygiene with set up assist when seated in bed Supervision    UB Dressing Moderate Assist    Min A to change gowns; assist to tie/untie back of gown; pt able to thread B UE's through sleeves; assist for monitor line management  Supervision    LB Dressing Dependent   To thread briefs over LEs and don over hips upon standing supported at walker     Dep to thread briefs over B LE's when long seated in bed (pt needed to use bedpan and briefs not donned over B hips); Dep to change socks; pt would benefit from follow up on AE for LE dressing 2* to hip precautions  Minimal Assist    Bathing Maximal Assist    Mod A ; pt able to wash UB and don deodorant, wash abdominal and shaun region; assist to wash back and buttocks when side rolling;  assist to wash LE's when long seated in bed 2* to hip precautions Minimal Assist    Toileting Dependent   For mgmt of puriwick catheter and hygiene care    Dep for hygiene 2* to incontinence of urine;  Mod/max A for side rolling for hygiene and for placement on bedpan, as pt attempting to have a bowel movement before she leaves; nsg notified pt on bedpan Minimal Assist    Bed Mobility Supine to sit:  Not Assessed   Sit to supine:  Not Assessed      Mod A for side rolling with placement of pillow and assist for life.      Pt required cues and education as noted above for safe facilitation and completion of tasks. Therapist provided skilled monitoring of patient's response during treatment session. Prior to and at the end of session, environmental modifications/line management completed for patients safety and efficiency of treatment session. Overall, patient demonstrates moderate difficulties with completion of BADLs and IADLs. Factors contributing to these difficulties include urinary incontinence, pain, hip precautions, decreased endurance, and generalized weakness. As noted above, patient likely to benefit from further OT intervention to increase independence, safety, and overall quality of life. Treatment:     Bed mobility: Facilitated bed mobility with cues for proper body mechanics and sequencing to prepare for ADL completion. Pillow between B LE's when log rolling to maintain hip precautions  ADL completion: Self-care retraining for the above-mentioned ADLs; training on proper hand placement, safety technique, sequencing, and energy conservation techniques. Postural Balance: Sitting balance retraining to improve righting reactions with postural changes during ADLs. Skilled positioning: Proper positioning to improve interaction with environment, overall functioning and decrease/prevent edema in LLE    Pt has made fair progress towards set goals   OT 1-3 days/wk for 2 weeks PRN     Treatment Time also includes thorough review of current medical information, gathering information on past medical history/social history and prior level of function, informal observation of tasks, assessment of data and education on plan of care and goals.     Treatment Time In: 10:32 AM    Treatment Time Out: 11:10 AM       Treatment Charges: Mins Units   ADL/Home Mgt     [address] [id number]       Ther Ex                 [id number]       Manual Therapy    [id number]     Neuro Re-ed         [id number]     Orthotic manage/training                               99256     Non Billable Time     Total Timed Treatment [address] [facility], KELLY/L #[id number]

## 2022-08-09 NOTE — PROGRESS NOTES
Nurse to nurse called to [facility].  Electronically signed by Brianna Mercer RN on 8/9/2022 at 11:16 AM

## 2022-08-09 NOTE — DISCHARGE SUMMARY
Internal Medicine Progress Note     [facility]     Ilene Rodriguez. Shin Greenwood., JATINDEROSunitaI. Juan M Stewart D.O., MILENA Fuller D.O. Marquis Bustos, MSN, APRN, NP-C  Manjula Hurst, MSN, APRN-CNP       Internal Medicine  Discharge Summary    NAME:   :  1931  MRN:  [id number]  PCP:Aiden Amador DO  ADMITTED: 2022      DISCHARGED: 22    ADMITTING PHYSICIAN: Halina Ledesma DO    CONSULTANT(S):   IP CONSULT TO INTERNAL MEDICINE  IP CONSULT TO ORTHOPEDIC SURGERY  IP CONSULT TO CARDIOLOGY  IP CONSULT TO SOCIAL WORK  IP CONSULT TO INFECTIOUS DISEASES     ADMITTING DIAGNOSIS:   Essential hypertension [I10]  New onset atrial fibrillation ([facility] [I48.91]  Subcapital fracture of neck of femur, left, closed, initial encounter ([facility] [facility] [S72.012A]  Urinary tract infection without hematuria, site unspecified [N39.0]     DISCHARGE DIAGNOSES:   Acute subcapital left femoral neck fracture status post stent was left hip replacement performed on  by Dr. Cynthia Langley onset atrial fibrillation with rate control. Non-insulin-dependent diabetes mellitus type 2  Atrial fibrillation with controlled rate with CHADS2 score high  Asymptomatic COVID-19    BRIEF HISTORY OF PRESENT ILLNESS:   Severo Halo is a polite and pleasant 22-year-old female who presented to [address] [place] [address] emergency department for the evaluation of left-sided hip pain. She denies any inciting event and refutes any fall history. Work-up in the emergency department revealed an acute subcapital left femoral neck fracture. While in the emergency department, she was also found to be in new onset atrial fibrillation but with rate control. She has no previous history of cardiovascular disease. She is on few medications at home. She denies active chest pain or shortness of breath at this time.   She is a somewhat poor historian overall and no family members were present during my examination    LABS[de-identified]  Lab Results   Component Value Date    WBC 10.0 08/09/2022    HGB 10.3 (L) 08/09/2022    HCT 30.1 (L) 08/09/2022     08/09/2022     08/09/2022    K 4.3 08/09/2022     08/09/2022    CREATININE 0.6 08/09/2022    BUN 31 (H) 08/09/2022    CO2 23 08/09/2022    GLUCOSE 112 (H) 08/09/2022    ALT 5 08/02/2022    AST 15 08/02/2022     No results found for: INR, PROTIME   Lab Results   Component Value Date    TSH 3.140 08/03/2022     Lab Results   Component Value Date    TRIG 70 08/03/2022    TRIG 116 04/22/2016    TRIG 167 (H) 11/25/2013     Lab Results   Component Value Date    HDL 78 08/03/2022    HDL 62 10/02/2017    HDL 65 04/22/2016     Lab Results   Component Value Date    LDLCALC 70 08/03/2022    LDLCALC 108 (H) 10/02/2017    LDLCALC 103 (H) 04/22/2016     Lab Results   Component Value Date    LABA1C 6.2 (H) 09/25/2019       IMAGING:  Echo Complete    Result Date: 8/3/2022  Transthoracic Echocardiography Report (TTE)  Demographics   Patient Name    Eli Bautista Gender            Female                  A   Medical Record  [id number]      Room Number       7398-5  Number   Account #       [de-identified]     Procedure Date    08/03/2022   Corporate ID                  Ordering          Tom POST                                Physician   Accession       [id number]    Referring  Number                        Physician   Date of Birth   09/12/1931    Sonographer       Francisco Shay RDCS   Age             80 year(s)    Interpreting      [address]                                Physician         Physician Cardiology                                                  Serenity Warren MD                                 Any Other  Procedure Type of Study   TTE procedure:Echo Complete W/Doppler & Color Flow. Procedure Date Date: 08/03/2022 Start: 11:54 AM Study Location: Echo Lab Technical Quality: Adequate visualization Indications:LV function.  Patient Status: Routine Height: 65 inches Weight: 118 pounds BSA: 1.58 m^2 BMI: 19.64 kg/m^2 BP: 157/68 mmHg  Findings   Left Ventricle  Normal left ventricular chamber size. Normal left ventricular systolic function, EF 59%. Mild left ventricular concentric hypertrophy noted. Indeterminate diastolic function. Right Ventricle  Normal right ventricle size and function. Left Atrium  Left atrium borderline enlarged. No thrombus in left atrium. Interatrial septum not well visualized but appears intact. Right Atrium  Normal right atrium. Mitral Valve  Normal mitral valve structure. There is mild mitral regurgitation. No mitral valve prolapse. Tricuspid Valve  Normal tricuspid valve structure. There is mild tricuspid regurgitation. Mild pulmonary hypertension,RVSP 44mmHg   Aortic Valve  The aortic valve appears mildly sclerotic. No hemodynamically significant aortic stenosis. There is mild aortic regurgitation, pressure 1/2 time 778ms. Pulmonic Valve  The pulmonic valve was not well visualized. Pericardial Effusion  No evidence of pericardial effusion. Pericardium appears normal.   Pleural Effusion  No evidence of pleural effusion. Aorta  Normal aortic root size ascending aorta. Miscellaneous  The inferior vena cava diameter is normal with normal respiratory  variation. Conclusions   Summary  Underlying A fib. Normal left ventricular chamber size. Normal left ventricular systolic function, EF 13%. Mild left ventricular concentric hypertrophy noted. Indeterminate diastolic function. Left atrium borderline enlarged. Interatrial septum not well visualized but appears intact. Normal right ventricle size and function. There is mild mitral regurgitation. No mitral valve prolapse. The aortic valve appears mildly sclerotic. No hemodynamically significant aortic stenosis. There is mild aortic regurgitation, pressure 1/2 time 778ms. There is mild tricuspid regurgitation.   Mild pulmonary hypertension,RVSP 44mmHg Normal aortic root size. No evidence of pericardial effusion. No intra cardiac mass or thrombus. No embolic source. No previous echo for comparison. Signature   ----------------------------------------------------------------  Electronically signed by Pawan Leonardo MD(Interpreting  physician) on 08/03/2022 02:59 PM  ----------------------------------------------------------------  M-Mode/2D Measurements & Calculations   LV Diastolic    LV Systolic Dimension: 2.1   AV Cusp Separation: 1.5 cmLA  Dimension: 4.1  cm                           Dimension: 3.8 cmAO Root  cm              LV Volume Diastolic: 03.3 ml Dimension: 2.7 cm  LV FS:48.8 %    LV Volume Systolic: 03.6 ml  LV PW           LV EDV/LV EDV Index: 59.2  Diastolic: 1.1  LS/59 MICHAEL/A^2EY ESV/LV ESV  cm              Index: 14.7 ml/9ml/ m^2      RV Diastolic Dimension: 2.2  LV PW Systolic: EF Calculated: 82.6 %        cm  1.6 cm          LV Mass Index: 96 l/min*m^2  Septum                                       LA/Aorta: 4.82  Diastolic: 1.1  cm              LVOT: 1.9 cm                 LA volume/Index: 61.7 ml  Septum  Systolic: 1.7  cm   LV Mass: 151.3  g  Doppler Measurements & Calculations   MV Peak E-Wave:   AV Peak Velocity: 1.98 m/s    LVOT Peak Velocity: 1.4  1.05 m/s          AV Peak Gradient: 15.65 mmHg  m/s  MV Peak A-Wave: 0 AV Mean Velocity: 1.39 m/s    LVOT Mean Velocity: 0.98  m/s               AV Mean Gradient: 8.5 mmHg    m/s  MV E/A Ratio:     AV VTI: 45.7 cm               LVOT Peak Gradient: 7.9  263.25            AV Area (Continuity):1.84     mmHgLVOT Mean Gradient:  MV Peak Gradient: cm^2                          4.2 mmHg  7 mmHg            AV Deceleration Time: 2683.3  MV Mean Gradient: msec  1.9 mmHg          LVOT VTI: 29.7 cm  MV Mean Velocity:                               TR Velocity:3.14 m/s  0.59 m/s                                        TR Gradient:39.36 mmHg  MV Deceleration   Pulm.  Vein A Reversal         PV Peak Velocity: 1.39 m/s  Time: 203.2 msec  Duration:669 msec             PV Peak Gradient: 7.7 mmHg  MV P1/2t: 64 msec Pulm. Vein D Velocity:0.53    PV Mean Velocity: 0.93 m/s  MVA by PHT:3.44   m/sPulm. Vein A Reversal      PV Mean Gradient: 3.8 mmHg  cm^2              Velocity:0.38 m/s  MV Area           Pulm. Vein S Velocity: 0.28  (continuity): 2.1 m/s  cm^2  [id number]    CT ABDOMEN PELVIS WO CONTRAST Additional Contrast? None    Result Date: 8/2/2022  EXAMINATION: CT OF THE ABDOMEN AND PELVIS WITHOUT CONTRAST 8/2/2022 4:22 pm TECHNIQUE: CT of the abdomen and pelvis was performed without the administration of intravenous contrast. Multiplanar reformatted images are provided for review. Automated exposure control, iterative reconstruction, and/or weight based adjustment of the mA/kV was utilized to reduce the radiation dose to as low as reasonably achievable. COMPARISON: April 20, 2011 HISTORY: ORDERING SYSTEM PROVIDED HISTORY: left groin pain x3 weeks TECHNOLOGIST PROVIDED HISTORY: Additional Contrast?->None Reason for exam:->left groin pain x3 weeks Decision Support Exception - unselect if not a suspected or confirmed emergency medical condition->Emergency Medical Condition (MA) FINDINGS: The lung bases are normal.  Small hiatal hernia is present. Liver, is normal.  Gallbladder is distended. Consider ultrasonography. Spleen, pancreas, the adrenals and the kidneys are normal.  Degenerative changes are identified in the lumbar spine. Pelvis. The bladder is distended. There is diverticulosis of colon without diverticulitis. There is constipation. The appendix is normal. There is an impacted minimally displaced subcapital fracture of the left femur. Impacted angulated SUBCAPITAL FRACTURE OF THE LEFT FEMUR . Surgical evaluation recommended. Diverticulosis of colon with constipation.      XR HIP LEFT (2-3 VIEWS)    Result Date: 8/4/2022  EXAMINATION: TWO XRAY VIEWS OF THE LEFT HIP 8/4/2022 6:25 pm COMPARISON: 08/02/2022 HISTORY: ORDERING SYSTEM PROVIDED HISTORY: post op TECHNOLOGIST PROVIDED HISTORY: PACU Reason for exam:->post op FINDINGS: There has been interval left hip arthroplasty, which demonstrates adequate alignment. No new fracture seen. Expected postsurgical changes of the soft tissues noted including skin stables. Status post left hip arthroplasty. XR HIP LEFT (2-3 VIEWS)    Result Date: 8/2/2022  EXAMINATION: TWO XRAY VIEWS OF THE LEFT HIP 8/2/2022 5:39 pm COMPARISON: 08/02/2022 4:27 p.m. abdomen/pelvis CT. HISTORY: ORDERING SYSTEM PROVIDED HISTORY: pain TECHNOLOGIST PROVIDED HISTORY: Reason for exam:->pain FINDINGS: There is a largely transverse impacted subcapital fracture left femoral neck with slight lateral displacement distal fracture fragment. No dislocation. Osseous mineralization is decreased. No other fractures. There is joint space narrowing marginal osteophyte formation at hips. The SI joints are normal.  There is moderate lower lumbar spondylosis and multilevel moderate-to-marked lower lumbar disc disease. Soft tissues are unremarkable. Acute subcapital left femoral neck fracture. XR CHEST 1 VIEW    Result Date: 8/2/2022  EXAMINATION: ONE XRAY VIEW OF THE CHEST 8/2/2022 3:39 pm COMPARISON: 10/02/2017 HISTORY: ORDERING SYSTEM PROVIDED HISTORY: pre-admit TECHNOLOGIST PROVIDED HISTORY: Reason for exam:->pre-admit FINDINGS: The lungs are without acute focal process. There is no effusion or pneumothorax. The cardiomediastinal silhouette is without acute process. The osseous structures are without acute process. No acute process. HOSPITAL COURSE:   William Danielson did well throughout the hospitalization. She was found to have suffered an acute subcapital left femoral neck fracture and underwent left hip replacement on August 4.   Postoperatively, she was found to be in new onset atrial fibrillation with rate control. Prior to discharge to the nursing home facility for additional rehabilitation, COVID testing came back positive thereby necessitating adjustments in the discharge plan. She has now been accepted to acute rehabilitation and will be transferred there today. She is totally asymptomatic from a COVID standpoint does not require oxygen or exhibit any respiratory complaints. Her pain is otherwise well controlled and she continues to require extensive work with the therapy teams. BRIEF PHYSICAL EXAMINATION AND LABORATORIES ON DAY OF DISCHARGE:  VITALS:  BP (!) 172/81   Pulse 72   Temp 97.7 °F (36.5 °C) (Oral)   Resp 16   Ht 5' 5\" (1.651 m)   Wt 130 lb 5 oz (59.1 kg)   SpO2 97%   BMI 21.69 kg/m²     HEENT:  PERRLA. EOMI. Sclera clear. Buccal mucosa moist.    Neck:  Supple. Trachea midline. No thyromegaly. No JVD. No bruits. Heart:  Rhythm regular, rate controlled. No murmurs. Lungs:  Symmetrical. Clear to auscultation bilaterally. No wheezes. No rhonchi. No rales. Abdomen: Soft. Non-tender. Non-distended. Bowel sounds positive. No organomegaly or masses. No pain on palpation    Extremities:  Peripheral pulses present. No peripheral edema. No ulcers. Postoperative dressings in place. Neurologic:  Alert x 3. No focal deficit. Cranial nerves grossly intact. Skin:  No petechia. No hemorrhage. No wounds. DISPOSITION:  The patient's condition is good. At this time the patient is without objective evidence of an acute process requiring continuing hospitalization or inpatient management. They are stable for discharge with outpatient follow-up. I have spoken with the patient and discussed the results of the current hospitalization, in addition to providing specific details for the plan of care and counseling regarding the diagnosis and prognosis.   The plan has been discussed in detail and they are aware of the specific conditions for emergent return, as well as the importance of follow-up. Their questions are answered at this time and they are agreeable with the plan for discharge to [address] [place] [address] Ave:   Current Discharge Medication List             Details   zinc gluconate 50 MG tablet Take 1 tablet by mouth in the morning. Qty: 30 tablet, Refills: 3      HYDROcodone-acetaminophen (NORCO) 5-325 MG per tablet Take 1 tablet by mouth every 6 hours as needed for Pain for up to 3 days. Use caution as may cause drowsiness or sedation. Do not operate machinery, take while working, drive or drink alcohol while taking. Qty: 12 tablet, Refills: 0    Comments: Reduce doses taken as pain becomes manageable  Associated Diagnoses: S/p left hip fracture      amLODIPine (NORVASC) 5 MG tablet Take 1 tablet by mouth in the morning. Qty: 30 tablet, Refills: 3      apixaban (ELIQUIS) 2.5 MG TABS tablet Take 1 tablet by mouth in the morning and 1 tablet before bedtime. Qty: 60 tablet      docusate sodium (COLACE, DULCOLAX) 100 MG CAPS Take 100 mg by mouth in the morning and 100 mg before bedtime. lisinopril (PRINIVIL;ZESTRIL) 40 MG tablet Take 1 tablet by mouth in the morning. Qty: 30 tablet, Refills: 3      polyethylene glycol (GLYCOLAX) 17 g packet Take 17 g by mouth in the morning. Qty: 527 g, Refills: 1      furosemide (LASIX) 20 MG tablet Take 1 tablet by mouth every other day  Qty: 60 tablet, Refills: 3                Details   ascorbic acid (VITAMIN C) 500 MG tablet Take 500 mg by mouth daily. vitamin D (ERGOCALCIFEROL) 400 UNITS CAPS Take 1,000 Units by mouth daily. multivitamin (THERAGRAN) per tablet Take 1 tablet by mouth daily. FOLLOW UP/INSTRUCTIONS:  This patient is instructed to follow-up with her primary care physician. Patient is instructed to follow-up with the consults listed above as directed by them.   she is instructed to resume home medications and take new medications as indicated in the list above. If the patient has a recurrence of symptoms, she is instructed to go to the ED. Preparing for this patient's discharge, including paperwork, orders, instructions, and meeting with patient did require > 40 minutes.     Jane Braga DO   8/9/2022  9:29 AM

## 2022-08-09 NOTE — PLAN OF CARE
Problem: Chronic Conditions and Co-morbidities  Goal: Patient's chronic conditions and co-morbidity symptoms are monitored and maintained or improved  Outcome: Progressing  Flowsheets (Taken 8/8/2022 2130)  Care Plan - Patient's Chronic Conditions and Co-Morbidity Symptoms are Monitored and Maintained or Improved:   Monitor and assess patient's chronic conditions and comorbid symptoms for stability, deterioration, or improvement   Collaborate with multidisciplinary team to address chronic and comorbid conditions and prevent exacerbation or deterioration   Update acute care plan with appropriate goals if chronic or comorbid symptoms are exacerbated and prevent overall improvement and discharge     Problem: Pain  Goal: Verbalizes/displays adequate comfort level or baseline comfort level  Outcome: Not Progressing     Problem: Anxiety  Goal: Will report anxiety at manageable levels  Description: INTERVENTIONS:  1. Administer medication as ordered  2. Teach and rehearse alternative coping skills  3. Provide emotional support with 1:1 interaction with staff  Outcome: Not Progressing  Flowsheets (Taken 8/8/2022 2130)  Will report anxiety at manageable levels:   Provide emotional support with 1:1 interaction with staff   Administer medication as ordered     Problem: Pain  Goal: Verbalizes/displays adequate comfort level or baseline comfort level  Outcome: Not Progressing     Problem: Anxiety  Goal: Will report anxiety at manageable levels  Description: INTERVENTIONS:  1. Administer medication as ordered  2. Teach and rehearse alternative coping skills  3.  Provide emotional support with 1:1 interaction with staff  Outcome: Not Progressing  Flowsheets (Taken 8/8/2022 2130)  Will report anxiety at manageable levels:   Provide emotional support with 1:1 interaction with staff   Administer medication as ordered

## 2022-08-09 NOTE — CARE COORDINATION
Discharge order noted, Patient is covid positive as of 8/8/22. WEN spoke with Preston Kenney at [facility], they have accepted, can take and do not need a precert today. Per Yasmeen Condon at [facility] they also can take and no precert needed.   Have reached out to daughter, Nubia to discuss options and she will speak with her brother and let WEN know ASAP

## 2022-08-09 NOTE — CONSULTS
[facility]  Consult Note    [address] [facility] [address]  [place], [address] [employer]  Phone [phone]   [phone]      Admit Date: 2022  2:19 PM  Pt Name: Chrystal Heard  MRN: [id number]  : 1931  Reason for Consult:    Chief Complaint   Patient presents with    Hip Pain     To er via ems from home for evaluation of left hip pain \"for quite a while\"\", but got worse this am getting OOB. Denies injury. Requesting Physician:  No att. providers found  PCP: Rudolph Christianson DO  History Obtained From:  patient, chart   ID consulted for Essential hypertension [I10]  New onset atrial fibrillation ([facility] [I48.91]  Subcapital fracture of neck of femur, left, closed, initial encounter ([facility] [S72.012A]  Urinary tract infection without hematuria, site unspecified [N39.0]  on hospital day  [facility]   Patient presents with    Hip Pain     To er via ems from home for evaluation of left hip pain \"for quite a while\"\", but got worse this am getting OOB. Denies injury. HISTORYOF PRESENT ILLNESS   Chrystal Heard is a 80 y.o. female who presents with   has a past medical history of Arthritis, Diabetes mellitus ([address] and Hyperthyroidism.    ED TRIAGEVITALS  BP: (!) 172/81, Temp: 97.7 °F (36.5 °C), Heart Rate: 72, Resp: 16, SpO2: 97 %  HPI  Pt presented to ER on 2022 with diagnosis of  Essential hypertension [I10]  New onset atrial fibrillation (HCC) [I48.91]  Subcapital fracture of neck of femur, left, closed, initial encounter ([facility] [S72.012A]  Urinary tract infection without hematuria, site unspecified [N39.0]    Pt had c/o hip pain  Per family dec oral intake   No antibiotics PTA  No recent travels  COVID vaccinated   Wbc7.6 cr0.8 urine cloudy bacteria le  CT a/p impcted angulated SUBCAPITAL FRACTURE OF THE LEFT FEMUR    Currrently on  No atbx  Has been afebrile has leukocytosis today wbc10    ID was asked to see for infection management covid + screen prior to d/c  Pt is in bed ha purwick incontinent  Has pain left hip   Had no BM  REVIEW OF SYSTEMS     CONSTITUTIONAL:   No fever, chills, weight loss  ALLERGIES:    No rash or itch  EYES:     No visual changes  ENT:      No  hearing loss or sore throat  CARDIOVASCULAR:   No chest pain or palpitations  RESPIRATORY:   No cough, sob  ENDOCRINE:    No increase thirst, urination   HEME-LYMPH:   No easy bruising or bleeding  GI:     No nausea, vomiting or diarrhea  :     No urinary complaints  NEURO:    No seizures, stroke, HA  MUSCULOSKELETAL:  No muscle aches or pain, no joint pain  SKIN:     No rash ulcers or wounds  PSYCH:    No depression or anxiety    No medications prior to admission. CURRENT MEDICATIONS   No current facility-administered medications for this encounter. Current Outpatient Medications:     zinc gluconate 50 MG tablet, Take 1 tablet by mouth in the morning., Disp: 30 tablet, Rfl: 3    HYDROcodone-acetaminophen (NORCO) 5-325 MG per tablet, Take 1 tablet by mouth every 6 hours as needed for Pain for up to 3 days. Use caution as may cause drowsiness or sedation. Do not operate machinery, take while working, drive or drink alcohol while taking., Disp: 12 tablet, Rfl: 0    amLODIPine (NORVASC) 5 MG tablet, Take 1 tablet by mouth in the morning., Disp: 30 tablet, Rfl: 3    apixaban (ELIQUIS) 2.5 MG TABS tablet, Take 1 tablet by mouth in the morning and 1 tablet before bedtime. , Disp: 60 tablet, Rfl:     docusate sodium (COLACE, DULCOLAX) 100 MG CAPS, Take 100 mg by mouth in the morning and 100 mg before bedtime. , Disp: , Rfl:     lisinopril (PRINIVIL;ZESTRIL) 40 MG tablet, Take 1 tablet by mouth in the morning., Disp: 30 tablet, Rfl: 3    polyethylene glycol (GLYCOLAX) 17 g packet, Take 17 g by mouth in the morning., Disp: 527 g, Rfl: 1    furosemide (LASIX) 20 MG tablet, Take 1 tablet by mouth every other day, Disp: 60 tablet, Rfl: 3    ascorbic acid (VITAMIN C) 500 MG tablet, Take 500 mg by mouth daily. , Disp: , Rfl:     vitamin D (ERGOCALCIFEROL) 400 UNITS CAPS, Take 1,000 Units by mouth daily. , Disp: , Rfl:     multivitamin (THERAGRAN) per tablet, Take 1 tablet by mouth daily.   , Disp: , Rfl:   ALLERGIES     Silvadene [silver sulfadiazine]  Immunization History   Administered Date(s) Administered    COVID-19, MODERNA BLUE border, Primary or Immunocompromised, (age 12y+), IM, 100 mcg/0.5mL 02/04/2021, 03/04/2021    Influenza Vaccine, unspecified formulation 09/29/2018    Influenza, Triv, inactivated, subunit, adjuvanted, IM (Fluad 65 yrs and older) 09/04/2019, 09/25/2020    Pneumococcal Conjugate 13-valent (Iagqbzj15) 09/29/2018, 09/29/2018    Pneumococcal Polysaccharide (Pashbonqy24) 10/04/2019    Tdap (Boostrix, Adacel) 10/18/2012      Internal Administration   First Dose COVID-19, HECTORA BLUE border, Primary or Immunocompromised, (age 12y+), IM, 100 mcg/0.5mL  02/04/2021   Second Dose COVID-19, HECTORA BLUE border, Primary or Immunocompromised, (age 12y+), IM, 100 mcg/0.5mL   03/04/2021       Last COVID Lab SARS-CoV-2 RNA, RT PCR (no units)   Date Value   08/08/2022 DETECTED (A)     SARS-CoV-2, NAAT (no units)   Date Value   08/08/2022 DETECTED (A)            PAST MEDICAL HISTORY     Past Medical History:   Diagnosis Date    Arthritis     right knee    Diabetes mellitus ([facility]     Hyperthyroidism      SURGICAL HISTORY       Past Surgical History:   Procedure Laterality Date    CATARACT REMOVAL WITH IMPLANT  2008    HIP SURGERY Left 8/4/2022    LEFT HIP HEMIARTHROPLASTY (DEPUY) performed by Nasreen Ward DO at [address] ARTHROSCOPY  2005    right    TONSILLECTOMY       FAMILY HISTORY       Family History   Problem Relation Age of Onset    Heart Failure Mother     Heart Failure Father      SOCIAL HISTORY       Social History     Socioeconomic History    Marital status:      Spouse name: None    Number of children: None    Years of education: None Highest education level: None   Tobacco Use    Smoking status: Never    Smokeless tobacco: Never   Substance and Sexual Activity    Alcohol use: Yes     Comment: rarely    Drug use: No     PHYSICAL EXAM       ED Triage Vitals [08/02/22 1455]   BP Temp Temp Source Heart Rate Resp SpO2 Height Weight   (!) 249/87 97.7 °F (36.5 °C) Oral 62 20 99 % 5' 5\" (1.651 m) 115 lb (52.2 kg)     Vitals:    Vitals:    08/08/22 2249 08/09/22 0553 08/09/22 0600 08/09/22 0818   BP:    (!) 172/81   Pulse:    72   Resp: 18 22  16   Temp:    97.7 °F (36.5 °C)   TempSrc:    Oral   SpO2:    97%   Weight:   130 lb 5 oz (59.1 kg)    Height:         Physical Exam   Constitutional/General: Alert and oriented, NAD thin  Head: NC/AT  Eyes: PERRL, EOMI  Mouth:  no thrush   Neck: Supple   Pulmonary: Lungs clear to auscultation bilaterally. Not in respiratory distress  Cardiovascular:  Regular rate and rhythm, no murmurs, gallops, or rubs. Abdomen: Soft, + BS.   distension. Nontender. Extremities: Moves all extremities x 4.    Warm and well perfused left hip dressed  Pulses:  Distal pulses    Skin: Warm and dry without rash  Neurologic:    No focal deficits  Psych: Normal Affect  Purwick      DIAGNOSTIC RESULTS   RADIOLOGY:   Echo Complete    Result Date: 8/3/2022  Transthoracic Echocardiography Report (TTE)  Demographics   Patient Name    Cassandra Cooper Gender            Female                  A   Medical Record  [id number]      Room Number       6734-7  Number   Account #       [de-identified]     Procedure Date    08/03/2022   Corporate ID                  Ordering          Asmita Gomez                                Physician   Accession       [id number]    Referring  Number                        Physician   Date of Birth   09/12/1931    Sonographer       Francisco [de-identified] RDCS   Age             80 year(s)    Interpreting      Venancio 64                                Physician         Physician Cardiology Vanda Staples MD                                 Any Other  Procedure Type of Study   TTE procedure:Echo Complete W/Doppler & Color Flow. Procedure Date Date: 08/03/2022 Start: 11:54 AM Study Location: Echo Lab Technical Quality: Adequate visualization Indications:LV function. Patient Status: Routine Height: 65 inches Weight: 118 pounds BSA: 1.58 m^2 BMI: 19.64 kg/m^2 BP: 157/68 mmHg  Findings   Left Ventricle  Normal left ventricular chamber size. Normal left ventricular systolic function, EF 78%. Mild left ventricular concentric hypertrophy noted. Indeterminate diastolic function. Right Ventricle  Normal right ventricle size and function. Left Atrium  Left atrium borderline enlarged. No thrombus in left atrium. Interatrial septum not well visualized but appears intact. Right Atrium  Normal right atrium. Mitral Valve  Normal mitral valve structure. There is mild mitral regurgitation. No mitral valve prolapse. Tricuspid Valve  Normal tricuspid valve structure. There is mild tricuspid regurgitation. Mild pulmonary hypertension,RVSP 44mmHg   Aortic Valve  The aortic valve appears mildly sclerotic. No hemodynamically significant aortic stenosis. There is mild aortic regurgitation, pressure 1/2 time 778ms. Pulmonic Valve  The pulmonic valve was not well visualized. Pericardial Effusion  No evidence of pericardial effusion. Pericardium appears normal.   Pleural Effusion  No evidence of pleural effusion. Aorta  Normal aortic root size ascending aorta. Miscellaneous  The inferior vena cava diameter is normal with normal respiratory  variation. Conclusions   Summary  Underlying A fib. Normal left ventricular chamber size. Normal left ventricular systolic function, EF 69%. Mild left ventricular concentric hypertrophy noted. Indeterminate diastolic function. Left atrium borderline enlarged. Interatrial septum not well visualized but appears intact.   Normal right ventricle size and function. There is mild mitral regurgitation. No mitral valve prolapse. The aortic valve appears mildly sclerotic. No hemodynamically significant aortic stenosis. There is mild aortic regurgitation, pressure 1/2 time 778ms. There is mild tricuspid regurgitation. Mild pulmonary hypertension,RVSP 44mmHg  Normal aortic root size. No evidence of pericardial effusion. No intra cardiac mass or thrombus. No embolic source. No previous echo for comparison.    Signature   ----------------------------------------------------------------  Electronically signed by Daniel Hadley MD(Interpreting  physician) on 08/03/2022 02:59 PM  ----------------------------------------------------------------  M-Mode/2D Measurements & Calculations   LV Diastolic    LV Systolic Dimension: 2.1   AV Cusp Separation: 1.5 cmLA  Dimension: 4.1  cm                           Dimension: 3.8 cmAO Root  cm              LV Volume Diastolic: 07.5 ml Dimension: 2.7 cm  LV FS:48.8 %    LV Volume Systolic: 60.3 ml  LV PW           LV EDV/LV EDV Index: 06.7  Diastolic: 1.1  FQ/15 JL/U^6LE ESV/LV ESV  cm              Index: 14.7 ml/9ml/ m^2      RV Diastolic Dimension: 2.2  LV PW Systolic: EF Calculated: 60.3 %        cm  1.6 cm          LV Mass Index: 96 l/min*m^2  Septum                                       LA/Aorta: 5.95  Diastolic: 1.1  cm              LVOT: 1.9 cm                 LA volume/Index: 61.7 ml  Septum  Systolic: 1.7  cm   LV Mass: 151.3  g  Doppler Measurements & Calculations   MV Peak E-Wave:   AV Peak Velocity: 1.98 m/s    LVOT Peak Velocity: 1.4  1.05 m/s          AV Peak Gradient: 15.65 mmHg  m/s  MV Peak A-Wave: 0 AV Mean Velocity: 1.39 m/s    LVOT Mean Velocity: 0.98  m/s               AV Mean Gradient: 8.5 mmHg    m/s  MV E/A Ratio:     AV VTI: 45.7 cm               LVOT Peak Gradient: 7.9  263.25            AV Area (Continuity):1.84     mmHgLVOT Mean Gradient:  MV Peak Gradient: cm^2 4. 2 mmHg  7 mmHg            AV Deceleration Time: 2683.3  MV Mean Gradient: msec  1.9 mmHg          LVOT VTI: 29.7 cm  MV Mean Velocity:                               TR Velocity:3.14 m/s  0.59 m/s                                        TR Gradient:39.36 mmHg  MV Deceleration   Pulm. Vein A Reversal         PV Peak Velocity: 1.39 m/s  Time: 203.2 msec  Duration:669 msec             PV Peak Gradient: 7.7 mmHg  MV P1/2t: 64 msec Pulm. Vein D Velocity:0.53    PV Mean Velocity: 0.93 m/s  MVA by PHT:3.44   m/sPulm. Vein A Reversal      PV Mean Gradient: 3.8 mmHg  cm^2              Velocity:0.38 m/s  MV Area           Pulm. Vein S Velocity: 0.28  (continuity): 2.1 m/s  cm^2  [email or web address] [id number]    CT ABDOMEN PELVIS WO CONTRAST Additional Contrast? None    Result Date: 8/2/2022  EXAMINATION: CT OF THE ABDOMEN AND PELVIS WITHOUT CONTRAST 8/2/2022 4:22 pm TECHNIQUE: CT of the abdomen and pelvis was performed without the administration of intravenous contrast. Multiplanar reformatted images are provided for review. Automated exposure control, iterative reconstruction, and/or weight based adjustment of the mA/kV was utilized to reduce the radiation dose to as low as reasonably achievable. COMPARISON: April 20, 2011 HISTORY: ORDERING SYSTEM PROVIDED HISTORY: left groin pain x3 weeks TECHNOLOGIST PROVIDED HISTORY: Additional Contrast?->None Reason for exam:->left groin pain x3 weeks Decision Support Exception - unselect if not a suspected or confirmed emergency medical condition->Emergency Medical Condition (MA) FINDINGS: The lung bases are normal.  Small hiatal hernia is present. Liver, is normal.  Gallbladder is distended. Consider ultrasonography. Spleen, pancreas, the adrenals and the kidneys are normal.  Degenerative changes are identified in the lumbar spine. Pelvis. The bladder is distended.   There is diverticulosis of colon without diverticulitis. There is constipation. The appendix is normal. There is an impacted minimally displaced subcapital fracture of the left femur. Impacted angulated SUBCAPITAL FRACTURE OF THE LEFT FEMUR . Surgical evaluation recommended. Diverticulosis of colon with constipation. XR HIP LEFT (2-3 VIEWS)    Result Date: 8/4/2022  EXAMINATION: TWO XRAY VIEWS OF THE LEFT HIP 8/4/2022 6:25 pm COMPARISON: 08/02/2022 HISTORY: ORDERING SYSTEM PROVIDED HISTORY: post op TECHNOLOGIST PROVIDED HISTORY: PACU Reason for exam:->post op FINDINGS: There has been interval left hip arthroplasty, which demonstrates adequate alignment. No new fracture seen. Expected postsurgical changes of the soft tissues noted including skin stables. Status post left hip arthroplasty. XR HIP LEFT (2-3 VIEWS)    Result Date: 8/2/2022  EXAMINATION: TWO XRAY VIEWS OF THE LEFT HIP 8/2/2022 5:39 pm COMPARISON: 08/02/2022 4:27 p.m. abdomen/pelvis CT. HISTORY: ORDERING SYSTEM PROVIDED HISTORY: pain TECHNOLOGIST PROVIDED HISTORY: Reason for exam:->pain FINDINGS: There is a largely transverse impacted subcapital fracture left femoral neck with slight lateral displacement distal fracture fragment. No dislocation. Osseous mineralization is decreased. No other fractures. There is joint space narrowing marginal osteophyte formation at hips. The SI joints are normal.  There is moderate lower lumbar spondylosis and multilevel moderate-to-marked lower lumbar disc disease. Soft tissues are unremarkable. Acute subcapital left femoral neck fracture. XR CHEST 1 VIEW    Result Date: 8/2/2022  EXAMINATION: ONE XRAY VIEW OF THE CHEST 8/2/2022 3:39 pm COMPARISON: 10/02/2017 HISTORY: ORDERING SYSTEM PROVIDED HISTORY: pre-admit TECHNOLOGIST PROVIDED HISTORY: Reason for exam:->pre-admit FINDINGS: The lungs are without acute focal process. There is no effusion or pneumothorax.  The cardiomediastinal silhouette is without acute process. The osseous structures are without acute process. No acute process. LABS  Recent Labs     08/07/22  0512 08/08/22  0515 08/09/22  0601   WBC 13.9* 13.9* 10.0   HGB 10.7* 10.5* 10.3*   HCT 32.3* 31.7* 30.1*   MCV 95.8 96.4 93.5    216 227     Recent Labs     08/07/22  0512 08/08/22  0515 08/09/22  0601    133 136   K 4.5 4.4 4.3    102 102   CO2 22 22 23   BUN 24* 33* 31*   CREATININE 0.6 0.8 0.6   GFRAA >60 >60 >60   LABGLOM >60 >60 >60   GLUCOSE 136* 116* 112*   CALCIUM 9.0 9.1 9.1     No results for input(s): PROCAL in the last 72 hours. Lab Results   Component Value Date    CRP <0.1 10/02/2017    CRP 0.1 04/22/2016    CRP 0.1 11/25/2013     No results found for: Austen Emilyjustus  No results found for: [id number]  Lab Results   Component Value Date/Time    COVID19 DETECTED 08/08/2022 02:30 PM    COVID19 DETECTED 08/08/2022 12:00 PM          Lab Results   Component Value Date/Time    COVID19 DETECTED 08/08/2022 02:30 PM    COVID19 DETECTED 08/08/2022 12:00 PM     COVID-19/BHAVYA-COV2 LABS  No results for input(s): CRP, PROCAL, FERRITIN, LDH, TROPONINI, DDIMER, FIBRINOGEN, INR, PROTIME, AST, ALT, TRIG in the last 72 hours. Lab Results   Component Value Date/Time    CHOL 162 08/03/2022 08:23 AM    TRIG 70 08/03/2022 08:23 AM    HDL 78 08/03/2022 08:23 AM    LDLCALC 70 08/03/2022 08:23 AM    LABVLDL 14 08/03/2022 08:23 AM           MICROBIOLOGY:     Urine Culture, Routine   Date Value Ref Range Status   08/02/2022   Final    10 to 100,000 CFU/mL  Mixed juan isolated. Further workup and sensitivity testing  is not routinely indicated and will not be performed. Mixed juan isolated includes:  Mixed gram positive organisms            FINAL IMPRESSION    Patient is a 80 y.o. female who presented with   Chief Complaint   Patient presents with    Hip Pain     To er via ems from home for evaluation of left hip pain \"for quite a while\"\", but got worse this am getting OOB.  Denies injury. and admitted for Essential hypertension [I10]  New onset atrial fibrillation ([facility] [I48.91]  Subcapital fracture of neck of femur, left, closed, initial encounter ([facility] [S72.012A]  Urinary tract infection without hematuria, site unspecified [N39.0]     Asymptomatic covid  Vaccinated cxry nap   Leukocytosis better  S/P left hip hemiarthroplasty on 8/4 for left displaced femoral neck fracture    MVI zinc ascorbic acid vitamin D  On RA no inpt rc  Can d/c  F/u prn      Imaging and labs were reviewed per medical records and any ID pertinent labs were addressed with the patient. An opportunity to ask questions was given to the patient/FAMILY and questions were answered. Thank you for involving me in the care of Noah Evans. Please do not hesitate to call for any questions or concerns.          Electronically signed by Calvin Gutierrez MD on 8/9/2022 at 2:57 PM

## 2022-08-09 NOTE — CARE COORDINATION
Spoke with patient and son, Grady Holliday who is at bedside. Discussed options for Covid positive rehab and they would like [place]. Patient has been accepted and no precert is needed they can take her today. Arranged transport via [facility] for 11:30am.  Ambulance form completed and placed on soft chart. Have notified patient and son, Grady Holliday who is at bedside.       [address] [place] [address]   N/N [phone]  [phone]

## 2022-09-28 DIAGNOSIS — R35.0 URINARY FREQUENCY: ICD-10-CM

## 2022-09-28 DIAGNOSIS — N39.0 URINARY TRACT INFECTION WITHOUT HEMATURIA, SITE UNSPECIFIED: Primary | ICD-10-CM

## 2022-09-28 LAB
BILIRUBIN URINE: NEGATIVE
BLOOD, URINE: NEGATIVE
CLARITY: CLEAR
COLOR: YELLOW
GLUCOSE URINE: NEGATIVE MG/DL
KETONES, URINE: NEGATIVE MG/DL
LEUKOCYTE ESTERASE, URINE: NEGATIVE
NITRITE, URINE: NEGATIVE
PH UA: 7 (ref 5–9)
PROTEIN UA: NEGATIVE MG/DL
SPECIFIC GRAVITY UA: 1.01 (ref 1–1.03)
UROBILINOGEN, URINE: 0.2 E.U./DL

## 2022-10-01 LAB
ORGANISM: ABNORMAL
URINE CULTURE, ROUTINE: ABNORMAL

## 2022-10-03 RX ORDER — SULFAMETHOXAZOLE AND TRIMETHOPRIM 800; 160 MG/1; MG/1
1 TABLET ORAL 2 TIMES DAILY
Qty: 10 TABLET | Refills: 0 | Status: SHIPPED | OUTPATIENT
Start: 2022-10-03 | End: 2022-10-08

## 2022-10-19 ENCOUNTER — OUTSIDE SERVICES (OUTPATIENT)
Dept: PRIMARY CARE CLINIC | Age: 87
End: 2022-10-19
Payer: MEDICARE

## 2022-10-19 DIAGNOSIS — A41.9 SEPSIS, DUE TO UNSPECIFIED ORGANISM, UNSPECIFIED WHETHER ACUTE ORGAN DYSFUNCTION PRESENT (HCC): Primary | ICD-10-CM

## 2022-10-19 DIAGNOSIS — I48.20 CHRONIC ATRIAL FIBRILLATION (HCC): ICD-10-CM

## 2022-10-19 DIAGNOSIS — R53.1 WEAKNESS: ICD-10-CM

## 2022-10-19 DIAGNOSIS — E11.9 TYPE 2 DIABETES MELLITUS WITHOUT COMPLICATION, UNSPECIFIED WHETHER LONG TERM INSULIN USE (HCC): ICD-10-CM

## 2022-10-19 DIAGNOSIS — I10 PRIMARY HYPERTENSION: ICD-10-CM

## 2022-10-19 DIAGNOSIS — E78.2 MIXED HYPERLIPIDEMIA: ICD-10-CM

## 2022-10-19 DIAGNOSIS — I25.10 ATHEROSCLEROSIS OF NATIVE CORONARY ARTERY WITHOUT ANGINA PECTORIS, UNSPECIFIED WHETHER NATIVE OR TRANSPLANTED HEART: ICD-10-CM

## 2022-10-19 PROCEDURE — 99306 1ST NF CARE HIGH MDM 50: CPT | Performed by: INTERNAL MEDICINE

## 2022-10-19 PROCEDURE — 99497 ADVNCD CARE PLAN 30 MIN: CPT | Performed by: INTERNAL MEDICINE

## 2022-10-26 ENCOUNTER — OUTSIDE SERVICES (OUTPATIENT)
Dept: PRIMARY CARE CLINIC | Age: 87
End: 2022-10-26

## 2022-10-26 DIAGNOSIS — E78.2 MIXED HYPERLIPIDEMIA: ICD-10-CM

## 2022-10-26 DIAGNOSIS — I10 PRIMARY HYPERTENSION: ICD-10-CM

## 2022-10-26 DIAGNOSIS — I25.10 ATHEROSCLEROSIS OF NATIVE CORONARY ARTERY WITHOUT ANGINA PECTORIS, UNSPECIFIED WHETHER NATIVE OR TRANSPLANTED HEART: ICD-10-CM

## 2022-10-26 DIAGNOSIS — R53.1 WEAKNESS: Primary | ICD-10-CM

## 2022-10-26 DIAGNOSIS — E11.9 TYPE 2 DIABETES MELLITUS WITHOUT COMPLICATION, UNSPECIFIED WHETHER LONG TERM INSULIN USE (HCC): ICD-10-CM

## 2022-10-26 DIAGNOSIS — I48.20 CHRONIC ATRIAL FIBRILLATION (HCC): ICD-10-CM

## 2022-11-02 ENCOUNTER — OUTSIDE SERVICES (OUTPATIENT)
Dept: PRIMARY CARE CLINIC | Age: 87
End: 2022-11-02

## 2022-11-02 DIAGNOSIS — I25.10 ATHEROSCLEROSIS OF NATIVE CORONARY ARTERY WITHOUT ANGINA PECTORIS, UNSPECIFIED WHETHER NATIVE OR TRANSPLANTED HEART: ICD-10-CM

## 2022-11-02 DIAGNOSIS — R53.1 WEAKNESS: Primary | ICD-10-CM

## 2022-11-02 DIAGNOSIS — E78.2 MIXED HYPERLIPIDEMIA: ICD-10-CM

## 2022-11-02 DIAGNOSIS — I10 PRIMARY HYPERTENSION: ICD-10-CM

## 2022-11-02 DIAGNOSIS — I48.20 CHRONIC ATRIAL FIBRILLATION (HCC): ICD-10-CM

## 2022-11-02 DIAGNOSIS — E11.9 TYPE 2 DIABETES MELLITUS WITHOUT COMPLICATION, UNSPECIFIED WHETHER LONG TERM INSULIN USE (HCC): ICD-10-CM

## 2022-11-09 ENCOUNTER — OUTSIDE SERVICES (OUTPATIENT)
Dept: PRIMARY CARE CLINIC | Age: 87
End: 2022-11-09

## 2022-11-09 DIAGNOSIS — I10 PRIMARY HYPERTENSION: ICD-10-CM

## 2022-11-09 DIAGNOSIS — E11.9 TYPE 2 DIABETES MELLITUS WITHOUT COMPLICATION, UNSPECIFIED WHETHER LONG TERM INSULIN USE (HCC): ICD-10-CM

## 2022-11-09 DIAGNOSIS — R53.1 WEAKNESS: Primary | ICD-10-CM

## 2022-11-09 DIAGNOSIS — E78.2 MIXED HYPERLIPIDEMIA: ICD-10-CM

## 2022-11-09 DIAGNOSIS — I25.10 ATHEROSCLEROSIS OF NATIVE CORONARY ARTERY WITHOUT ANGINA PECTORIS, UNSPECIFIED WHETHER NATIVE OR TRANSPLANTED HEART: ICD-10-CM

## 2022-11-09 DIAGNOSIS — I48.20 CHRONIC ATRIAL FIBRILLATION (HCC): ICD-10-CM

## 2022-11-11 ASSESSMENT — ENCOUNTER SYMPTOMS
SINUS PAIN: 0
VOICE CHANGE: 0
WHEEZING: 0
EYE REDNESS: 0
TROUBLE SWALLOWING: 0
SINUS PRESSURE: 0
SORE THROAT: 0
EYE DISCHARGE: 0
COUGH: 0
SHORTNESS OF BREATH: 0
GASTROINTESTINAL NEGATIVE: 1
RHINORRHEA: 0
EYE ITCHING: 0

## 2022-11-11 ASSESSMENT — VISUAL ACUITY: OU: 1

## 2022-11-11 NOTE — PROGRESS NOTES
Visit Date: 10/19/22  Kumar Goddard  9/12/1931  female 80 y.o. Subjective:    CC: Patient presents with weakness. Patient presents for follow up of diabetes, htn, hyperlipidemia, cad,and afib. HPI:  Extremity Weakness  Chronicity: was admitted to the hospital for sepsis but is resolved. she is very weak and admitted to the nursing home for rehab. The current episode started in the past 7 days. The problem occurs intermittently. The problem has been gradually improving. Pertinent negatives include no congestion, coughing, myalgias or sore throat. Nothing aggravates the symptoms. Treatments tried: working with therapy and was given exercises to do. The treatment provided mild relief. Coronary Artery Disease  Pertinent negatives include no shortness of breath. Risk factors include hyperlipidemia. The symptoms have been stable. Compliance with diet is variable. Compliance with exercise is variable. Compliance with medications: taking medications, no medication side effects. Atrial Fibrillation  Presents for follow-up visit. Symptoms are negative for shortness of breath. The symptoms have been stable. Past medical history includes CAD and hyperlipidemia. Compliance problems: taking medications, no medication side effects. Hypertension  This is a chronic problem. The current episode started more than 1 year ago. The problem is unchanged. The problem is controlled. Pertinent negatives include no shortness of breath. There are no associated agents to hypertension. Risk factors for coronary artery disease include diabetes mellitus and dyslipidemia. Treatments tried: taking medications, no medication side effects. The current treatment provides mild improvement. There are no compliance problems. Hypertensive end-organ damage includes CAD/MI. Hyperlipidemia  This is a chronic problem. The current episode started more than 1 year ago. The problem is controlled.  Recent lipid tests were reviewed and are variable. There are no known factors aggravating her hyperlipidemia. Pertinent negatives include no myalgias or shortness of breath. Treatments tried: taking medications, no medication side effects. The current treatment provides mild improvement of lipids. There are no compliance problems. Risk factors for coronary artery disease include diabetes mellitus, dyslipidemia and hypertension. Diabetes  She presents for her follow-up diabetic visit. She has type 2 diabetes mellitus. Her disease course has been stable. There are no hypoglycemic associated symptoms. Pertinent negatives for hypoglycemia include no confusion or nervousness/anxiousness. There are no diabetic associated symptoms. There are no hypoglycemic complications. There are no diabetic complications. Risk factors for coronary artery disease include diabetes mellitus, dyslipidemia and hypertension. Current diabetic treatments: taking medications, no medication side effects. She is compliant with treatment most of the time. Her weight is fluctuating minimally. She is following a generally healthy diet. Her home blood glucose trend is fluctuating minimally. ROS:  Review of Systems   Constitutional: Negative. HENT:  Negative for congestion, ear discharge, ear pain, mouth sores, nosebleeds, postnasal drip, rhinorrhea, sinus pressure, sinus pain, sneezing, sore throat, trouble swallowing and voice change. Eyes:  Negative for discharge, redness, itching and visual disturbance. Denies diplopia, recent change in visual acuity, blurred vision, and night vision loss. Does not wear corrective lenses. Respiratory:  Negative for cough, shortness of breath and wheezing. Denies asthma, COPD, hemoptysis   Cardiovascular: Negative. Gastrointestinal: Negative. Endocrine: Negative. Genitourinary:  Negative for difficulty urinating and urgency.         Denies hesitancy, incomplete voiding, and polyuria   Musculoskeletal:  Negative for myalgias. Denies joint pain   Skin: Negative. Hematological: Negative. Psychiatric/Behavioral:  Negative for confusion and suicidal ideas. The patient is not nervous/anxious. Denies difficulty concentrating, depression, flight of ideas, slow thought processes, suicide attempts      Current Meds: Refer to nursing home record    PMH:    Medical Problems: reviewed and updated       Diagnosis Date    Arthritis     right knee    Diabetes mellitus ([address]     Hyperthyroidism         Surgical Hx: reviewed and updated   Past Surgical History:   Procedure Laterality Date    CATARACT REMOVAL WITH IMPLANT  2008    HIP SURGERY Left 8/4/2022    LEFT HIP HEMIARTHROPLASTY (DEPUY) performed by Milka Hart DO at [address] [employer] ARTHROSCOPY  2005    right    TONSILLECTOMY          FH: reviewed and updated       Problem Relation Age of Onset    Heart Failure Mother     Heart Failure Father         SH: reviewed and updated    reports that she has never smoked. She has never used smokeless tobacco. She reports current alcohol use. She reports that she does not use drugs. Objective: Wt: 137.2 BP: 132/78 Pulse: 70 Resp: 18 T: 98.1F     Physical Exam:  Physical Exam  Vitals reviewed. Constitutional:       General: She is not in acute distress. Appearance: Normal appearance. She is normal weight. She is not ill-appearing or toxic-appearing. Comments: Appears appropriate for age   HENT:      Head: Normocephalic and atraumatic. Right Ear: Tympanic membrane and external ear normal.      Left Ear: Tympanic membrane and external ear normal.      Ears:      Comments: Middle ear well aerated. Nose: Nose normal.      Mouth/Throat:      Mouth: Mucous membranes are moist.      Dentition: Normal dentition. No gingival swelling or dental caries. Pharynx: Oropharynx is clear. Uvula midline. Comments: Gums appear healthy. No thrush no mucositis  Eyes:      General: Vision grossly intact. Extraocular Movements: Extraocular movements intact. Conjunctiva/sclera: Conjunctivae normal.      Pupils: Pupils are equal, round, and reactive to light. Comments: Fundoscopic exam is benign  Retinal vessels: Normal   Neck:      Vascular: No carotid bruit. Comments: Thyroid is normal in size. Carotids 2+ and equal bilaterally  Cardiovascular:      Rate and Rhythm: Normal rate and regular rhythm. Pulses: Normal pulses. Heart sounds: Normal heart sounds, S1 normal and S2 normal. No murmur heard. No friction rub. No gallop. Comments: Pedal pulses 2+ and equal bilaterally  Pulmonary:      Effort: Pulmonary effort is normal.      Breath sounds: Normal breath sounds. Abdominal:      General: Bowel sounds are normal. There is no distension. Palpations: Abdomen is soft. There is no mass. Tenderness: There is no abdominal tenderness. There is no guarding or rebound. Hernia: No hernia is present. Comments: No rigidity   Musculoskeletal:         General: Normal range of motion. Right shoulder: Normal.      Left shoulder: Normal.      Right upper arm: Normal.      Left upper arm: Normal.      Cervical back: Normal range of motion. Right hip: Normal.      Left hip: Normal.      Right upper leg: Normal.      Left upper leg: Normal.      Right lower leg: Normal.      Left lower leg: Normal.      Right foot: Normal.      Left foot: Normal.      Comments: Generalized weakness    Lymphadenopathy:      Comments: No palpable or visible regional lymphadenopathy   Skin:     General: Skin is warm and dry. Findings: No bruising, ecchymosis, lesion or rash. Neurological:      General: No focal deficit present. Mental Status: She is alert. Mental status is at baseline. Cranial Nerves: No cranial nerve deficit. Deep Tendon Reflexes: Reflexes normal.   Psychiatric:         Mood and Affect: Mood and affect normal. Mood is not depressed. Behavior: Behavior normal. Behavior is not agitated. Thought Content: Thought content normal.         Judgment: Judgment normal.      Comments: No apparent anxiety       Assessment & Plan:  1. Sepsis, due to unspecified organism, unspecified whether acute organ dysfunction present ([facility] [address]  2. Weakness  3. Atherosclerosis of native coronary artery without angina pectoris, unspecified whether native or transplanted heart  4. Chronic atrial fibrillation (HCC)  5. Primary hypertension  6. Mixed hyperlipidemia  7.  Type 2 diabetes mellitus without complication, unspecified whether long term insulin use [facility])     Hospital notes reviewed   Chart and medications reviewed   Continue therapy   Check labs   Continue current treatment   Advanced directives discussed with patient and she is a full code     Milton BORGES MA  am scribing for MATTEO Sweeney Latrelle Senegal, MD, personally performed the services described in this documentation as scribed by Milton Marie and it is both accurate and complete

## 2022-11-16 ENCOUNTER — OUTSIDE SERVICES (OUTPATIENT)
Dept: PRIMARY CARE CLINIC | Age: 87
End: 2022-11-16

## 2022-11-16 DIAGNOSIS — R53.1 WEAKNESS: Primary | ICD-10-CM

## 2022-11-16 DIAGNOSIS — E11.9 TYPE 2 DIABETES MELLITUS WITHOUT COMPLICATION, UNSPECIFIED WHETHER LONG TERM INSULIN USE (HCC): ICD-10-CM

## 2022-11-16 DIAGNOSIS — I48.20 CHRONIC ATRIAL FIBRILLATION (HCC): ICD-10-CM

## 2022-11-16 DIAGNOSIS — I10 PRIMARY HYPERTENSION: ICD-10-CM

## 2022-11-16 DIAGNOSIS — I25.10 ATHEROSCLEROSIS OF NATIVE CORONARY ARTERY WITHOUT ANGINA PECTORIS, UNSPECIFIED WHETHER NATIVE OR TRANSPLANTED HEART: ICD-10-CM

## 2022-11-16 DIAGNOSIS — E78.2 MIXED HYPERLIPIDEMIA: ICD-10-CM

## 2022-11-23 ENCOUNTER — OUTSIDE SERVICES (OUTPATIENT)
Dept: PRIMARY CARE CLINIC | Age: 87
End: 2022-11-23

## 2022-11-23 DIAGNOSIS — E11.9 TYPE 2 DIABETES MELLITUS WITHOUT COMPLICATION, UNSPECIFIED WHETHER LONG TERM INSULIN USE (HCC): ICD-10-CM

## 2022-11-23 DIAGNOSIS — I10 PRIMARY HYPERTENSION: ICD-10-CM

## 2022-11-23 DIAGNOSIS — R53.1 WEAKNESS: Primary | ICD-10-CM

## 2022-11-23 DIAGNOSIS — I25.10 ATHEROSCLEROSIS OF NATIVE CORONARY ARTERY WITHOUT ANGINA PECTORIS, UNSPECIFIED WHETHER NATIVE OR TRANSPLANTED HEART: ICD-10-CM

## 2022-11-23 DIAGNOSIS — I48.20 CHRONIC ATRIAL FIBRILLATION (HCC): ICD-10-CM

## 2022-11-23 DIAGNOSIS — E78.2 MIXED HYPERLIPIDEMIA: ICD-10-CM

## 2022-11-30 ASSESSMENT — ENCOUNTER SYMPTOMS
EYE REDNESS: 0
TROUBLE SWALLOWING: 0
EYE ITCHING: 0
SORE THROAT: 0
WHEEZING: 0
SHORTNESS OF BREATH: 0
RHINORRHEA: 0
VOICE CHANGE: 0
EYE DISCHARGE: 0
SINUS PRESSURE: 0
SINUS PAIN: 0
COUGH: 0
GASTROINTESTINAL NEGATIVE: 1

## 2022-11-30 ASSESSMENT — VISUAL ACUITY: OU: 1

## 2022-11-30 NOTE — PROGRESS NOTES
Visit Date: 10/26/22  Rise Sames  9/12/1931  female 80 y.o. Subjective:    CC: Patient presents with weakness. Patient presents for follow up of diabetes, htn, hyperlipidemia, cad,and afib. HPI:  Extremity Weakness  Chronicity: was admitted to the hospital for sepsis but is resolved. she is very weak and admitted to the nursing home for rehab. The current episode started in the past 7 days. The problem occurs intermittently. The problem has been gradually improving. Pertinent negatives include no congestion, coughing, myalgias or sore throat. Nothing aggravates the symptoms. Treatments tried: working with therapy and was given exercises to do. The treatment provided mild relief. Coronary Artery Disease  Pertinent negatives include no shortness of breath. Risk factors include hyperlipidemia. The symptoms have been stable. Compliance with diet is variable. Compliance with exercise is variable. Compliance with medications: taking medications, no medication side effects. Atrial Fibrillation  Presents for follow-up visit. Symptoms are negative for shortness of breath. The symptoms have been stable. Past medical history includes CAD and hyperlipidemia. Compliance problems: taking medications, no medication side effects. Hypertension  This is a chronic problem. The current episode started more than 1 year ago. The problem is unchanged. The problem is controlled. Pertinent negatives include no shortness of breath. There are no associated agents to hypertension. Risk factors for coronary artery disease include diabetes mellitus and dyslipidemia. Treatments tried: taking medications, no medication side effects. The current treatment provides mild improvement. There are no compliance problems. Hypertensive end-organ damage includes CAD/MI. Hyperlipidemia  This is a chronic problem. The current episode started more than 1 year ago. The problem is controlled.  Recent lipid tests were reviewed and are myalgias. Denies joint pain   Skin: Negative. Hematological: Negative. Psychiatric/Behavioral:  Negative for confusion and suicidal ideas. The patient is not nervous/anxious. Denies difficulty concentrating, depression, flight of ideas, slow thought processes, suicide attempts      Current Meds: Refer to nursing home record    PMH:    Medical Problems: reviewed and updated       Diagnosis Date    Arthritis     right knee    Diabetes mellitus ([address]     Hyperthyroidism         Surgical Hx: reviewed and updated   Past Surgical History:   Procedure Laterality Date    CATARACT REMOVAL WITH IMPLANT  2008    HIP SURGERY Left 8/4/2022    LEFT HIP HEMIARTHROPLASTY (DEPUY) performed by Wai Licona DO at [address] [employer] ARTHROSCOPY  2005    right    TONSILLECTOMY          FH: reviewed and updated       Problem Relation Age of Onset    Heart Failure Mother     Heart Failure Father         SH: reviewed and updated    reports that she has never smoked. She has never used smokeless tobacco. She reports current alcohol use. She reports that she does not use drugs. Objective: Wt: 137.2 BP: 132/78 Pulse: 70 Resp: 18 T: 98.1F     Physical Exam:  Physical Exam  Vitals reviewed. Constitutional:       General: She is not in acute distress. Appearance: Normal appearance. She is normal weight. She is not ill-appearing or toxic-appearing. Comments: Appears appropriate for age   HENT:      Head: Normocephalic and atraumatic. Right Ear: Tympanic membrane and external ear normal.      Left Ear: Tympanic membrane and external ear normal.      Ears:      Comments: Middle ear well aerated. Nose: Nose normal.      Mouth/Throat:      Mouth: Mucous membranes are moist.      Dentition: Normal dentition. No gingival swelling or dental caries. Pharynx: Oropharynx is clear. Uvula midline. Comments: Gums appear healthy. No thrush no mucositis  Eyes:      General: Vision grossly intact. Behavior: Behavior normal. Behavior is not agitated. Comments: No apparent anxiety       Assessment & Plan:  1. Weakness  2. Atherosclerosis of native coronary artery without angina pectoris, unspecified whether native or transplanted heart  3. Chronic atrial fibrillation ([address]  4. Primary hypertension  5. Mixed hyperlipidemia  6.  Type 2 diabetes mellitus without complication, unspecified whether long term insulin use (HCC)     Chart reviewed   Medications reviewed   Had esbl of the urine and she was started on macrobid   Continue therapy  Monitor labs   Continue current treatment      I, Angelina Matute MA  am scribing for MATTEO Motley MD, personally performed the services described in this documentation as scribed by Angelina Matute and it is both accurate and complete

## 2022-12-06 ASSESSMENT — ENCOUNTER SYMPTOMS
EYE DISCHARGE: 0
EYE ITCHING: 0
WHEEZING: 0
SHORTNESS OF BREATH: 0
TROUBLE SWALLOWING: 0
RHINORRHEA: 0
EYE REDNESS: 0
SINUS PRESSURE: 0
COUGH: 0
SORE THROAT: 0
GASTROINTESTINAL NEGATIVE: 1
VOICE CHANGE: 0
SINUS PAIN: 0

## 2022-12-06 ASSESSMENT — VISUAL ACUITY: OU: 1

## 2022-12-06 NOTE — PROGRESS NOTES
Visit Date: 11/2/22  Sophia Greer  9/12/1931  female 80 y.o. Subjective:    CC: Patient presents with weakness. Patient presents for follow up of diabetes, htn, hyperlipidemia, cad,and afib. HPI:  Extremity Weakness  Chronicity: she is improving with therapy. has bilateral leg swelling. The current episode started in the past 7 days. The problem occurs intermittently. The problem has been gradually improving. Pertinent negatives include no congestion, coughing, myalgias or sore throat. Nothing aggravates the symptoms. Treatments tried: working with therapy and was given exercises to do. The treatment provided mild relief. Coronary Artery Disease  Presents for follow-up visit. Pertinent negatives include no shortness of breath. Risk factors include hyperlipidemia. The symptoms have been stable. Compliance with diet is variable. Compliance with exercise is variable. Compliance with medications: taking medications, no medication side effects. Atrial Fibrillation  Presents for follow-up visit. Symptoms are negative for shortness of breath. The symptoms have been stable. Past medical history includes CAD and hyperlipidemia. Compliance problems: taking medications, no medication side effects. Hypertension  This is a chronic problem. The current episode started more than 1 year ago. The problem is unchanged. The problem is controlled. Pertinent negatives include no shortness of breath. There are no associated agents to hypertension. Risk factors for coronary artery disease include diabetes mellitus and dyslipidemia. Treatments tried: taking medications, no medication side effects. The current treatment provides mild improvement. There are no compliance problems. Hypertensive end-organ damage includes CAD/MI. Hyperlipidemia  This is a chronic problem. The current episode started more than 1 year ago. The problem is controlled. Recent lipid tests were reviewed and are variable.  There are no known factors aggravating her hyperlipidemia. Pertinent negatives include no myalgias or shortness of breath. Treatments tried: taking medications, no medication side effects. The current treatment provides mild improvement of lipids. There are no compliance problems. Risk factors for coronary artery disease include diabetes mellitus, dyslipidemia and hypertension. Diabetes  She presents for her follow-up diabetic visit. She has type 2 diabetes mellitus. Her disease course has been stable. There are no hypoglycemic associated symptoms. Pertinent negatives for hypoglycemia include no confusion or nervousness/anxiousness. There are no diabetic associated symptoms. There are no hypoglycemic complications. There are no diabetic complications. Risk factors for coronary artery disease include diabetes mellitus, dyslipidemia and hypertension. Current diabetic treatments: taking medications, no medication side effects. She is compliant with treatment most of the time. Her weight is fluctuating minimally. She is following a generally healthy diet. Her home blood glucose trend is fluctuating minimally. ROS:  Review of Systems   Constitutional: Negative. HENT:  Negative for congestion, ear discharge, ear pain, mouth sores, nosebleeds, postnasal drip, rhinorrhea, sinus pressure, sinus pain, sneezing, sore throat, trouble swallowing and voice change. Eyes:  Negative for discharge, redness, itching and visual disturbance. Denies diplopia, recent change in visual acuity, blurred vision, and night vision loss. Does not wear corrective lenses. Respiratory:  Negative for cough, shortness of breath and wheezing. Denies asthma, COPD, hemoptysis   Cardiovascular: Negative. Gastrointestinal: Negative. Endocrine: Negative. Genitourinary:  Negative for difficulty urinating and urgency. Denies hesitancy, incomplete voiding, and polyuria   Musculoskeletal:  Negative for myalgias.         Denies joint pain Skin: Negative. Hematological: Negative. Psychiatric/Behavioral:  Negative for confusion and suicidal ideas. The patient is not nervous/anxious. Denies difficulty concentrating, depression, flight of ideas, slow thought processes, suicide attempts      Current Meds: Refer to nursing home record    PMH:    Medical Problems:       Diagnosis Date    Arthritis     right knee    Diabetes mellitus ([address]     Hyperthyroidism         Surgical Hx:   Past Surgical History:   Procedure Laterality Date    CATARACT REMOVAL WITH IMPLANT  2008    HIP SURGERY Left 8/4/2022    LEFT HIP HEMIARTHROPLASTY (DEPUY) performed by Moises Berman DO at [address] [employer] ARTHROSCOPY  2005    right    TONSILLECTOMY          FH:      Problem Relation Age of Onset    Heart Failure Mother     Heart Failure Father         SH:    reports that she has never smoked. She has never used smokeless tobacco. She reports current alcohol use. She reports that she does not use drugs. Objective: Wt: 137 BP: 110/60 Pulse: 62 Resp: 18 T: 98.1F     Physical Exam:  Physical Exam  Vitals reviewed. Constitutional:       General: She is not in acute distress. Appearance: Normal appearance. She is normal weight. She is not ill-appearing or toxic-appearing. Comments: Appears appropriate for age   HENT:      Head: Normocephalic and atraumatic. Right Ear: Tympanic membrane and external ear normal.      Left Ear: Tympanic membrane and external ear normal.      Ears:      Comments: Middle ear well aerated. Nose: Nose normal.      Mouth/Throat:      Mouth: Mucous membranes are moist.      Dentition: Normal dentition. No gingival swelling or dental caries. Pharynx: Oropharynx is clear. Uvula midline. Comments: Gums appear healthy. No thrush no mucositis  Eyes:      General: Vision grossly intact. Extraocular Movements: Extraocular movements intact.       Conjunctiva/sclera: Conjunctivae normal.      Pupils: Pupils are equal, round, and reactive to light. Comments: Fundoscopic exam is benign  Retinal vessels: Normal   Neck:      Vascular: No carotid bruit. Comments: Thyroid is normal in size. Carotids 2+ and equal bilaterally  Cardiovascular:      Rate and Rhythm: Normal rate and regular rhythm. Pulses: Normal pulses. Heart sounds: Normal heart sounds, S1 normal and S2 normal. No murmur heard. No friction rub. No gallop. Comments: Pedal pulses 2+ and equal bilaterally  Pulmonary:      Effort: Pulmonary effort is normal.      Breath sounds: Normal breath sounds. Abdominal:      General: Bowel sounds are normal. There is no distension. Palpations: Abdomen is soft. There is no mass. Tenderness: There is no abdominal tenderness. There is no guarding or rebound. Hernia: No hernia is present. Comments: No rigidity   Musculoskeletal:         General: Normal range of motion. Right shoulder: Normal.      Left shoulder: Normal.      Right upper arm: Normal.      Left upper arm: Normal.      Cervical back: Normal range of motion. Right hip: Normal.      Left hip: Normal.      Right upper leg: Normal.      Left upper leg: Normal.      Right lower leg: Normal.      Left lower leg: Normal.      Right foot: Normal.      Left foot: Normal.      Comments: Generalized weakness    Lymphadenopathy:      Comments: No palpable or visible regional lymphadenopathy   Skin:     General: Skin is warm and dry. Findings: No bruising, ecchymosis, lesion or rash. Neurological:      General: No focal deficit present. Mental Status: She is alert. Mental status is at baseline. Cranial Nerves: No cranial nerve deficit. Deep Tendon Reflexes: Reflexes normal.   Psychiatric:         Mood and Affect: Mood and affect normal. Mood is not depressed. Behavior: Behavior normal. Behavior is not agitated. Comments: No apparent anxiety       Assessment & Plan:  1. Weakness  2. Atherosclerosis of native coronary artery without angina pectoris, unspecified whether native or transplanted heart  3. Chronic atrial fibrillation ([facility] [address]  4. Primary hypertension  5. Mixed hyperlipidemia  6.  Type 2 diabetes mellitus without complication, unspecified whether long term insulin use (HCC)     Chart reviewed   Medications reviewed   Add support hose for leg edema   Continue therapy  Monitor labs   Continue current treatment      IArnoldo MA  am scribing for Dr. Geoff King MA   I, Claudette Mixer, MD, personally performed the services described in this documentation as scribed by Arnoldo Costello and it is both accurate and complete

## 2022-12-13 ENCOUNTER — OFFICE VISIT (OUTPATIENT)
Dept: PRIMARY CARE CLINIC | Age: 87
End: 2022-12-13

## 2022-12-13 VITALS
BODY MASS INDEX: 17.33 KG/M2 | OXYGEN SATURATION: 100 % | WEIGHT: 104 LBS | TEMPERATURE: 97.9 F | HEART RATE: 79 BPM | HEIGHT: 65 IN | SYSTOLIC BLOOD PRESSURE: 130 MMHG | DIASTOLIC BLOOD PRESSURE: 70 MMHG

## 2022-12-13 DIAGNOSIS — I48.91 NEW ONSET ATRIAL FIBRILLATION (HCC): ICD-10-CM

## 2022-12-13 DIAGNOSIS — F33.0 MAJOR DEPRESSIVE DISORDER, RECURRENT, MILD (HCC): Primary | ICD-10-CM

## 2022-12-13 DIAGNOSIS — Z09 HOSPITAL DISCHARGE FOLLOW-UP: ICD-10-CM

## 2022-12-13 DIAGNOSIS — E55.9 VITAMIN D DEFICIENCY: ICD-10-CM

## 2022-12-13 DIAGNOSIS — E11.9 DIET-CONTROLLED DIABETES MELLITUS (HCC): ICD-10-CM

## 2022-12-13 DIAGNOSIS — E78.2 MIXED HYPERLIPIDEMIA: ICD-10-CM

## 2022-12-13 PROBLEM — U07.1 COVID-19: Status: RESOLVED | Noted: 2022-08-08 | Resolved: 2022-12-13

## 2022-12-13 PROBLEM — S72.90XA FEMUR FRACTURE (HCC): Status: RESOLVED | Noted: 2022-08-02 | Resolved: 2022-12-13

## 2022-12-13 PROBLEM — Z86.39 HISTORY OF HYPOTHYROIDISM: Status: RESOLVED | Noted: 2020-04-07 | Resolved: 2022-12-13

## 2022-12-13 PROBLEM — S72.012A SUBCAPITAL FRACTURE OF NECK OF FEMUR, LEFT, CLOSED, INITIAL ENCOUNTER (HCC): Status: RESOLVED | Noted: 2022-08-02 | Resolved: 2022-12-13

## 2022-12-13 RX ORDER — FUROSEMIDE 20 MG/1
20 TABLET ORAL DAILY
COMMUNITY
End: 2022-12-13 | Stop reason: SDUPTHER

## 2022-12-13 RX ORDER — ASPIRIN 81 MG/1
81 TABLET ORAL DAILY
COMMUNITY
End: 2022-12-13 | Stop reason: SDUPTHER

## 2022-12-13 RX ORDER — METOPROLOL SUCCINATE 25 MG/1
12.5 TABLET, EXTENDED RELEASE ORAL DAILY
Qty: 90 TABLET | Refills: 0 | Status: SHIPPED | OUTPATIENT
Start: 2022-12-13

## 2022-12-13 RX ORDER — CLOPIDOGREL BISULFATE 75 MG/1
75 TABLET ORAL DAILY
COMMUNITY
End: 2022-12-13 | Stop reason: SDUPTHER

## 2022-12-13 RX ORDER — LISINOPRIL 2.5 MG/1
2.5 TABLET ORAL DAILY
COMMUNITY
End: 2022-12-13 | Stop reason: SDUPTHER

## 2022-12-13 RX ORDER — ATORVASTATIN CALCIUM 40 MG/1
40 TABLET, FILM COATED ORAL DAILY
Qty: 90 TABLET | Refills: 0 | Status: SHIPPED | OUTPATIENT
Start: 2022-12-13

## 2022-12-13 RX ORDER — ATORVASTATIN CALCIUM 40 MG/1
40 TABLET, FILM COATED ORAL DAILY
COMMUNITY
End: 2022-12-13 | Stop reason: SDUPTHER

## 2022-12-13 RX ORDER — POTASSIUM CHLORIDE 20 MEQ/1
TABLET, EXTENDED RELEASE ORAL
COMMUNITY
Start: 2022-11-29 | End: 2022-12-13 | Stop reason: SDUPTHER

## 2022-12-13 RX ORDER — ASPIRIN 81 MG/1
81 TABLET ORAL DAILY
Qty: 90 TABLET | Refills: 0 | Status: SHIPPED | OUTPATIENT
Start: 2022-12-13

## 2022-12-13 RX ORDER — POTASSIUM CHLORIDE 20 MEQ/1
20 TABLET, EXTENDED RELEASE ORAL DAILY
Qty: 90 TABLET | Refills: 0 | Status: SHIPPED | OUTPATIENT
Start: 2022-12-13

## 2022-12-13 RX ORDER — SERTRALINE HYDROCHLORIDE 25 MG/1
25 TABLET, FILM COATED ORAL DAILY
Qty: 90 TABLET | Refills: 0 | Status: SHIPPED | OUTPATIENT
Start: 2022-12-13

## 2022-12-13 RX ORDER — CLOPIDOGREL BISULFATE 75 MG/1
75 TABLET ORAL DAILY
Qty: 90 TABLET | Refills: 0 | Status: SHIPPED | OUTPATIENT
Start: 2022-12-13

## 2022-12-13 RX ORDER — LISINOPRIL 2.5 MG/1
2.5 TABLET ORAL DAILY
Qty: 90 TABLET | Refills: 0 | Status: SHIPPED | OUTPATIENT
Start: 2022-12-13

## 2022-12-13 RX ORDER — FUROSEMIDE 20 MG/1
20 TABLET ORAL DAILY
Qty: 90 TABLET | Refills: 0 | Status: SHIPPED | OUTPATIENT
Start: 2022-12-13

## 2022-12-13 ASSESSMENT — ENCOUNTER SYMPTOMS
EYE DISCHARGE: 0
PHOTOPHOBIA: 0
WHEEZING: 0
DIARRHEA: 0
TROUBLE SWALLOWING: 0
VOMITING: 0
ABDOMINAL DISTENTION: 0
COUGH: 0
NAUSEA: 0
APNEA: 0
BLOOD IN STOOL: 0
EYE ITCHING: 0
SINUS PAIN: 0
BACK PAIN: 0
SHORTNESS OF BREATH: 0
ABDOMINAL PAIN: 0
CONSTIPATION: 0
SORE THROAT: 0

## 2022-12-13 NOTE — PROGRESS NOTES
Nancy Mercer (:  1931) is a 80 y.o. female,Established patient, here for evaluation of the following chief complaint(s):  Follow-Up from [address] [place] [address] [place]:  HPI:  1)Hypertension:  Patient is here for follow up chronic hypertension. This is  generally controlled on current medication regimen. BP Readings from Last 1 Encounters:   22 130/70        Takes meds as directed and tolerates them well. Most recent labs reviewed with patient and are not remarkable. No symptoms from htn standpoint per ROS. Patient is  compliant with lifestyle modifications. Patient does not smoke. Comorbid conditions include none  2)Hyperlipidemia:  Patient is here to follow up regarding chronic hyperlipidemia. This is  generally controlled. Treatment includes simvastatin. Patient is  compliant with lifestyle modifications. Patient is not a smoker. Most recent labs reviewed with patient today and are not remarkable. Comorbid conditions include none  3)chf: well controlled with lasix  4)a fib: pt is in nsr now. on plavix and aspirin  5) depression: we will start on zoloft    Review of Systems   Constitutional:  Negative for activity change, appetite change, fever and unexpected weight change. HENT:  Negative for congestion, ear pain, hearing loss, sinus pain, sore throat, tinnitus and trouble swallowing. Eyes:  Negative for photophobia, discharge, itching and visual disturbance. Respiratory:  Negative for apnea, cough, shortness of breath and wheezing. Cardiovascular:  Negative for chest pain, palpitations and leg swelling. Gastrointestinal:  Negative for abdominal distention, abdominal pain, blood in stool, constipation, diarrhea, nausea and vomiting. Endocrine: Negative for cold intolerance, polydipsia and polyuria. Genitourinary:  Negative for difficulty urinating, dysuria, frequency and pelvic pain.    Musculoskeletal:  Negative for arthralgias, back pain, joint swelling, myalgias, neck pain and neck stiffness. Skin:  Negative for rash and wound. Neurological:  Negative for dizziness, tremors, syncope, light-headedness and headaches. Objective   /70 (Site: Right Upper Arm, Position: Sitting, Cuff Size: Small Adult)   Pulse 79   Temp 97.9 °F (36.6 °C) (Temporal)   Ht 5' 5\" (1.651 m)   Wt 104 lb (47.2 kg)   SpO2 100%   BMI 17.31 kg/m²   Current Outpatient Medications   Medication Sig Dispense Refill    metoprolol succinate (TOPROL XL) 25 MG extended release tablet Take 0.5 tablets by mouth daily 90 tablet 0    aspirin 81 MG EC tablet Take 1 tablet by mouth daily 90 tablet 0    atorvastatin (LIPITOR) 40 MG tablet Take 1 tablet by mouth daily 90 tablet 0    clopidogrel (PLAVIX) 75 MG tablet Take 1 tablet by mouth daily 90 tablet 0    furosemide (LASIX) 20 MG tablet Take 1 tablet by mouth daily 90 tablet 0    lisinopril (PRINIVIL;ZESTRIL) 2.5 MG tablet Take 1 tablet by mouth daily 90 tablet 0    potassium chloride (KLOR-CON M) 20 MEQ extended release tablet Take 1 tablet by mouth daily 90 tablet 0    sertraline (ZOLOFT) 25 MG tablet Take 1 tablet by mouth daily 90 tablet 0    multivitamin (THERAGRAN) per tablet Take 1 tablet by mouth daily. No current facility-administered medications for this visit.       Allergies   Allergen Reactions    Silvadene [Silver Sulfadiazine] Other (See Comments)     Burning and discomfort        Past Medical History:   Diagnosis Date    Arthritis     right knee    Diabetes mellitus ([facility] [address]     Hyperthyroidism      Past Surgical History:   Procedure Laterality Date    CATARACT REMOVAL WITH IMPLANT  2008    HIP SURGERY Left 8/4/2022    LEFT HIP HEMIARTHROPLASTY (DEPUY) performed by Mitra Burton DO at [address] ARTHROSCOPY  2005    right    TONSILLECTOMY       Family History   Problem Relation Age of Onset    Heart Failure Mother     Heart Failure Father       Social History     Socioeconomic History Marital status:      Spouse name: Not on file    Number of children: Not on file    Years of education: Not on file    Highest education level: Not on file   Occupational History    Not on file   Tobacco Use    Smoking status: Never    Smokeless tobacco: Never   Substance and Sexual Activity    Alcohol use: Yes     Comment: rarely    Drug use: No    Sexual activity: Not on file   Other Topics Concern    Not on file   Social History Narrative    Not on file     Social Determinants of Health     Financial Resource Strain: Low Risk     Difficulty of Paying Living Expenses: Not hard at all   Food Insecurity: No Food Insecurity    Worried About Running Out of Food in the Last Year: Never true    Ran Out of Food in the Last Year: Never true   Transportation Needs: Not on file   Physical Activity: Not on file   Stress: Not on file   Social Connections: Not on file   Intimate Partner Violence: Not on file   Housing Stability: Not on file      Health Maintenance Due   Topic Date Due    Shingles vaccine (1 of 2) Never done    Annual Wellness Visit (AWV)  Never done    COVID-19 Vaccine (4 - Booster for Moderna series) 12/24/2021    Flu vaccine (1) 08/01/2022        Physical Exam  Constitutional:       Appearance: Normal appearance. She is normal weight. HENT:      Head: Normocephalic and atraumatic. Right Ear: Tympanic membrane and ear canal normal.      Left Ear: Tympanic membrane and ear canal normal.      Nose: Nose normal.      Mouth/Throat:      Mouth: Mucous membranes are moist.      Pharynx: Oropharynx is clear. Eyes:      Extraocular Movements: Extraocular movements intact. Conjunctiva/sclera: Conjunctivae normal.      Pupils: Pupils are equal, round, and reactive to light. Cardiovascular:      Rate and Rhythm: Normal rate and regular rhythm. Pulses: Normal pulses. Heart sounds: Normal heart sounds.    Pulmonary:      Effort: Pulmonary effort is normal.      Breath sounds: Normal breath sounds. Abdominal:      General: Abdomen is flat. Bowel sounds are normal.      Palpations: Abdomen is soft. Musculoskeletal:         General: Normal range of motion. Right shoulder: Normal.      Left shoulder: Normal.      Right upper arm: Normal.      Left upper arm: Normal.      Right elbow: Normal.      Left elbow: Normal.      Right forearm: Normal.      Left forearm: Normal.      Right wrist: Normal.      Left wrist: Normal.      Right hand: Normal.      Left hand: Normal.      Cervical back: Normal, normal range of motion and neck supple. Lumbar back: Normal.   Skin:     General: Skin is warm and dry. Neurological:      General: No focal deficit present. Mental Status: She is alert and oriented to person, place, and time. Mental status is at baseline. Psychiatric:         Mood and Affect: Mood normal.         Behavior: Behavior normal.         Thought Content: Thought content normal.         Judgment: Judgment normal.             ASSESSMENT/PLAN:  1. Major depressive disorder, recurrent, mild  2. Hospital discharge follow-up  -     [place] DISCHARGE MEDS RECONCILED W/ CURRENT OUTPATIENT MED LIST  3. New onset atrial fibrillation (HCC)  -     T4, Free; Future  -     TSH; Future  -     CBC with Auto Differential; Future  4. Diet-controlled diabetes mellitus ([facility] [address]  5. Vitamin D deficiency  6. Mixed hyperlipidemia  -     Comprehensive Metabolic Panel; Future  -     Lipid, Fasting; Future      No follow-ups on file. An electronic signature was used to authenticate this note.     --Corinne Brumfield MD

## 2022-12-23 ASSESSMENT — ENCOUNTER SYMPTOMS
COUGH: 0
SINUS PAIN: 0
VOICE CHANGE: 0
EYE ITCHING: 0
SORE THROAT: 0
SINUS PRESSURE: 0
RHINORRHEA: 0
SHORTNESS OF BREATH: 0
GASTROINTESTINAL NEGATIVE: 1
EYE REDNESS: 0
EYE DISCHARGE: 0
TROUBLE SWALLOWING: 0
WHEEZING: 0

## 2022-12-23 ASSESSMENT — VISUAL ACUITY: OU: 1

## 2022-12-23 NOTE — PROGRESS NOTES
Visit Date: 11/9/22  Treasure Turk  9/12/1931  female 80 y.o. Subjective:    CC: Patient presents with weakness. Patient presents for follow up of diabetes, htn, hyperlipidemia, cad,and afib. HPI:  Extremity Weakness  Chronicity: she is improving with therapy. has bilateral leg swelling. The current episode started in the past 7 days. The problem occurs intermittently. The problem has been gradually improving. Pertinent negatives include no congestion, coughing, myalgias or sore throat. Nothing aggravates the symptoms. Treatments tried: working with therapy and was given exercises to do. The treatment provided mild relief. Coronary Artery Disease  Presents for follow-up visit. Pertinent negatives include no shortness of breath. Risk factors include hyperlipidemia. The symptoms have been stable. Compliance with diet is variable. Compliance with exercise is variable. Compliance with medications: taking medications, no medication side effects. Atrial Fibrillation  Presents for follow-up visit. Symptoms are negative for shortness of breath. The symptoms have been stable. Past medical history includes CAD and hyperlipidemia. Compliance problems: taking medications, no medication side effects. Hypertension  This is a chronic problem. The current episode started more than 1 year ago. The problem is unchanged. The problem is controlled. Pertinent negatives include no shortness of breath. There are no associated agents to hypertension. Risk factors for coronary artery disease include diabetes mellitus and dyslipidemia. Treatments tried: taking medications, no medication side effects. The current treatment provides mild improvement. There are no compliance problems. Hypertensive end-organ damage includes CAD/MI. Hyperlipidemia  This is a chronic problem. The current episode started more than 1 year ago. The problem is controlled. Recent lipid tests were reviewed and are variable.  There are no known factors aggravating her hyperlipidemia. Pertinent negatives include no myalgias or shortness of breath. Treatments tried: taking medications, no medication side effects. The current treatment provides mild improvement of lipids. There are no compliance problems. Risk factors for coronary artery disease include diabetes mellitus, dyslipidemia and hypertension. Diabetes  She presents for her follow-up diabetic visit. She has type 2 diabetes mellitus. Her disease course has been stable. There are no hypoglycemic associated symptoms. Pertinent negatives for hypoglycemia include no confusion or nervousness/anxiousness. There are no diabetic associated symptoms. There are no hypoglycemic complications. There are no diabetic complications. Risk factors for coronary artery disease include diabetes mellitus, dyslipidemia and hypertension. Current diabetic treatments: taking medications, no medication side effects. She is compliant with treatment most of the time. Her weight is fluctuating minimally. She is following a generally healthy diet. Her home blood glucose trend is fluctuating minimally. ROS:  Review of Systems   Constitutional: Negative. HENT:  Negative for congestion, ear discharge, ear pain, mouth sores, nosebleeds, postnasal drip, rhinorrhea, sinus pressure, sinus pain, sneezing, sore throat, trouble swallowing and voice change. Eyes:  Negative for discharge, redness, itching and visual disturbance. Denies diplopia, recent change in visual acuity, blurred vision, and night vision loss. Does not wear corrective lenses. Respiratory:  Negative for cough, shortness of breath and wheezing. Denies asthma, COPD, hemoptysis   Cardiovascular: Negative. Gastrointestinal: Negative. Endocrine: Negative. Genitourinary:  Negative for difficulty urinating and urgency. Denies hesitancy, incomplete voiding, and polyuria   Musculoskeletal:  Negative for myalgias.         Denies joint pain are equal, round, and reactive to light. Comments: Fundoscopic exam is benign  Retinal vessels: Normal   Neck:      Vascular: No carotid bruit. Comments: Thyroid is normal in size. Carotids 2+ and equal bilaterally  Cardiovascular:      Rate and Rhythm: Normal rate and regular rhythm. Pulses: Normal pulses. Heart sounds: Normal heart sounds, S1 normal and S2 normal. No murmur heard. No friction rub. No gallop. Comments: Pedal pulses 2+ and equal bilaterally  Pulmonary:      Effort: Pulmonary effort is normal.      Breath sounds: Normal breath sounds. Abdominal:      General: Bowel sounds are normal. There is no distension. Palpations: Abdomen is soft. There is no mass. Tenderness: There is no abdominal tenderness. There is no guarding or rebound. Hernia: No hernia is present. Comments: No rigidity   Musculoskeletal:         General: Normal range of motion. Right shoulder: Normal.      Left shoulder: Normal.      Right upper arm: Normal.      Left upper arm: Normal.      Cervical back: Normal range of motion. Right hip: Normal.      Left hip: Normal.      Right upper leg: Normal.      Left upper leg: Normal.      Right lower leg: Normal.      Left lower leg: Normal.      Right foot: Normal.      Left foot: Normal.      Comments: Generalized weakness    Lymphadenopathy:      Comments: No palpable or visible regional lymphadenopathy   Skin:     General: Skin is warm and dry. Findings: No bruising, ecchymosis, lesion or rash. Neurological:      General: No focal deficit present. Mental Status: She is alert. Mental status is at baseline. Cranial Nerves: No cranial nerve deficit. Deep Tendon Reflexes: Reflexes normal.   Psychiatric:         Mood and Affect: Mood and affect normal. Mood is not depressed. Behavior: Behavior normal. Behavior is not agitated. Comments: No apparent anxiety       Assessment & Plan:  1. Weakness  2. Atherosclerosis of native coronary artery without angina pectoris, unspecified whether native or transplanted heart  3. Chronic atrial fibrillation ([facility] [address]  4. Primary hypertension  5. Mixed hyperlipidemia  6.  Type 2 diabetes mellitus without complication, unspecified whether long term insulin use (HCC)     Chart reviewed   Medications reviewed   Continue therapy  Monitor labs   Continue current treatment      Jamilah BROGES MA  am scribing for Dr. Deandra Hassan MA   IAlessandra MD, personally performed the services described in this documentation as scribed by Jamilah Castro and it is both accurate and complete

## 2022-12-30 ASSESSMENT — ENCOUNTER SYMPTOMS
EYE REDNESS: 0
WHEEZING: 0
RHINORRHEA: 0
COUGH: 0
TROUBLE SWALLOWING: 0
SORE THROAT: 0
EYE ITCHING: 0
VOICE CHANGE: 0
EYE DISCHARGE: 0
SHORTNESS OF BREATH: 0
GASTROINTESTINAL NEGATIVE: 1
SINUS PRESSURE: 0
SINUS PAIN: 0

## 2022-12-30 ASSESSMENT — VISUAL ACUITY: OU: 1

## 2022-12-30 NOTE — PROGRESS NOTES
Visit Date: 11/16/22  Johny Frankel  9/12/1931  female 80 y.o. Subjective:    CC: Patient presents with weakness. Patient presents for follow up of diabetes, htn, hyperlipidemia, cad,and afib. HPI:  Extremity Weakness  Chronicity: she is improving with therapy. has bilateral leg swelling. The current episode started in the past 7 days. The problem occurs intermittently. The problem has been gradually improving. Pertinent negatives include no congestion, coughing, myalgias or sore throat. Nothing aggravates the symptoms. Treatments tried: working with therapy and was given exercises to do. The treatment provided mild relief. Coronary Artery Disease  Presents for follow-up visit. Pertinent negatives include no shortness of breath. Risk factors include hyperlipidemia. The symptoms have been stable. Compliance with diet is variable. Compliance with exercise is variable. Compliance with medications: taking medications, no medication side effects. Atrial Fibrillation  Presents for follow-up visit. Symptoms are negative for shortness of breath. The symptoms have been stable. Past medical history includes CAD and hyperlipidemia. Compliance problems: taking medications, no medication side effects. Hypertension  This is a chronic problem. The current episode started more than 1 year ago. The problem is unchanged. The problem is controlled. Pertinent negatives include no shortness of breath. There are no associated agents to hypertension. Risk factors for coronary artery disease include diabetes mellitus and dyslipidemia. Treatments tried: taking medications, no medication side effects. The current treatment provides mild improvement. There are no compliance problems. Hypertensive end-organ damage includes CAD/MI. Hyperlipidemia  This is a chronic problem. The current episode started more than 1 year ago. The problem is controlled. Recent lipid tests were reviewed and are variable.  There are no known factors aggravating her hyperlipidemia. Pertinent negatives include no myalgias or shortness of breath. Treatments tried: taking medications, no medication side effects. The current treatment provides mild improvement of lipids. There are no compliance problems. Risk factors for coronary artery disease include diabetes mellitus, dyslipidemia and hypertension. Diabetes  She presents for her follow-up diabetic visit. She has type 2 diabetes mellitus. Her disease course has been stable. There are no hypoglycemic associated symptoms. Pertinent negatives for hypoglycemia include no confusion or nervousness/anxiousness. There are no diabetic associated symptoms. There are no hypoglycemic complications. There are no diabetic complications. Risk factors for coronary artery disease include diabetes mellitus, dyslipidemia and hypertension. Current diabetic treatments: taking medications, no medication side effects. She is compliant with treatment most of the time. Her weight is fluctuating minimally. She is following a generally healthy diet. Her home blood glucose trend is fluctuating minimally. ROS:  Review of Systems   Constitutional: Negative. HENT:  Negative for congestion, ear discharge, ear pain, mouth sores, nosebleeds, postnasal drip, rhinorrhea, sinus pressure, sinus pain, sneezing, sore throat, trouble swallowing and voice change. Eyes:  Negative for discharge, redness, itching and visual disturbance. Denies diplopia, recent change in visual acuity, blurred vision, and night vision loss. Does not wear corrective lenses. Respiratory:  Negative for cough, shortness of breath and wheezing. Denies asthma, COPD, hemoptysis   Cardiovascular: Negative. Gastrointestinal: Negative. Endocrine: Negative. Genitourinary:  Negative for difficulty urinating and urgency. Denies hesitancy, incomplete voiding, and polyuria   Musculoskeletal:  Negative for myalgias.         Denies joint pain Skin: Negative. Hematological: Negative. Psychiatric/Behavioral:  Negative for confusion and suicidal ideas. The patient is not nervous/anxious. Denies difficulty concentrating, depression, flight of ideas, slow thought processes, suicide attempts      Current Meds: Refer to nursing home record    PMH:    Medical Problems:       Diagnosis Date    Arthritis     right knee    Diabetes mellitus ([address]     Hyperthyroidism         Surgical Hx:   Past Surgical History:   Procedure Laterality Date    CATARACT REMOVAL WITH IMPLANT  2008    HIP SURGERY Left 8/4/2022    LEFT HIP HEMIARTHROPLASTY (DEPUY) performed by Meaghan Reina DO at [address] ARTHROSCOPY  2005    right    TONSILLECTOMY          FH:      Problem Relation Age of Onset    Heart Failure Mother     Heart Failure Father         SH:    reports that she has never smoked. She has never used smokeless tobacco. She reports current alcohol use. She reports that she does not use drugs. Objective: Wt: 137.2 BP: 115/73 Pulse: 98 Resp: 18 T: 97.8F     Physical Exam:  Physical Exam  Vitals reviewed. Constitutional:       General: She is not in acute distress. Appearance: Normal appearance. She is normal weight. She is not ill-appearing or toxic-appearing. Comments: Appears appropriate for age   HENT:      Head: Normocephalic and atraumatic. Right Ear: Tympanic membrane and external ear normal.      Left Ear: Tympanic membrane and external ear normal.      Ears:      Comments: Middle ear well aerated. Nose: Nose normal.      Mouth/Throat:      Mouth: Mucous membranes are moist.      Dentition: Normal dentition. No gingival swelling or dental caries. Pharynx: Oropharynx is clear. Uvula midline. Comments: Gums appear healthy. No thrush no mucositis  Eyes:      General: Vision grossly intact. Extraocular Movements: Extraocular movements intact.       Conjunctiva/sclera: Conjunctivae normal.      Pupils: Pupils are equal, round, and reactive to light. Comments: Fundoscopic exam is benign  Retinal vessels: Normal   Neck:      Vascular: No carotid bruit. Comments: Thyroid is normal in size. Carotids 2+ and equal bilaterally  Cardiovascular:      Rate and Rhythm: Normal rate and regular rhythm. Pulses: Normal pulses. Heart sounds: Normal heart sounds, S1 normal and S2 normal. No murmur heard. No friction rub. No gallop. Comments: Pedal pulses 2+ and equal bilaterally  Pulmonary:      Effort: Pulmonary effort is normal.      Breath sounds: Normal breath sounds. Abdominal:      General: Bowel sounds are normal. There is no distension. Palpations: Abdomen is soft. There is no mass. Tenderness: There is no abdominal tenderness. There is no guarding or rebound. Hernia: No hernia is present. Comments: No rigidity   Musculoskeletal:         General: Normal range of motion. Right shoulder: Normal.      Left shoulder: Normal.      Right upper arm: Normal.      Left upper arm: Normal.      Cervical back: Normal range of motion. Right hip: Normal.      Left hip: Normal.      Right upper leg: Normal.      Left upper leg: Normal.      Right lower leg: Normal.      Left lower leg: Normal.      Right foot: Normal.      Left foot: Normal.      Comments: Generalized weakness    Lymphadenopathy:      Comments: No palpable or visible regional lymphadenopathy   Skin:     General: Skin is warm and dry. Findings: No bruising, ecchymosis, lesion or rash. Neurological:      General: No focal deficit present. Mental Status: She is alert. Mental status is at baseline. Cranial Nerves: No cranial nerve deficit. Deep Tendon Reflexes: Reflexes normal.   Psychiatric:         Mood and Affect: Mood and affect normal. Mood is not depressed. Behavior: Behavior normal. Behavior is not agitated. Comments: No apparent anxiety       Assessment & Plan:  1. Weakness  2. Atherosclerosis of native coronary artery without angina pectoris, unspecified whether native or transplanted heart  3. Chronic atrial fibrillation ([facility]  4. Primary hypertension  5. Mixed hyperlipidemia  6.  Type 2 diabetes mellitus without complication, unspecified whether long term insulin use ([facility]     Chart reviewed   Medications reviewed   She is going to be discharged home and will need a wheelchair and walker she is not steady  Continue current treatment      IAna MA  am scribing for Dr. Kayy Hartmann MA   IPantera MD, personally performed the services described in this documentation as scribed by Ana Rincon and it is both accurate and complete

## 2023-01-03 ASSESSMENT — ENCOUNTER SYMPTOMS
EYE REDNESS: 0
GASTROINTESTINAL NEGATIVE: 1
SHORTNESS OF BREATH: 0
EYE DISCHARGE: 0
TROUBLE SWALLOWING: 0
WHEEZING: 0
EYE ITCHING: 0
SINUS PRESSURE: 0
SORE THROAT: 0
VOICE CHANGE: 0
RHINORRHEA: 0
SINUS PAIN: 0
COUGH: 0

## 2023-01-03 ASSESSMENT — VISUAL ACUITY: OU: 1

## 2023-01-03 NOTE — PROGRESS NOTES
Visit Date: 11/23/22  Yanni Kohler  9/12/1931  female 80 y.o. Subjective:    CC: Patient presents with weakness. Patient presents for follow up of diabetes, htn, hyperlipidemia, cad,and afib. HPI:  Extremity Weakness  Chronicity: she is improving with therapy. has bilateral leg swelling. The current episode started 1 to 4 weeks ago. The problem occurs intermittently. The problem has been gradually improving. Pertinent negatives include no congestion, coughing, myalgias or sore throat. Nothing aggravates the symptoms. Treatments tried: working with therapy and was given exercises to do. The treatment provided mild relief. Coronary Artery Disease  Presents for follow-up visit. Pertinent negatives include no shortness of breath. Risk factors include hyperlipidemia. The symptoms have been stable. Compliance with diet is variable. Compliance with exercise is variable. Compliance with medications: taking medications, no medication side effects. Atrial Fibrillation  Presents for follow-up visit. Symptoms are negative for shortness of breath. The symptoms have been stable. Past medical history includes CAD and hyperlipidemia. Compliance problems: taking medications, no medication side effects. Hypertension  This is a chronic problem. The current episode started more than 1 year ago. The problem is unchanged. The problem is controlled. Pertinent negatives include no shortness of breath. There are no associated agents to hypertension. Risk factors for coronary artery disease include diabetes mellitus and dyslipidemia. Treatments tried: taking medications, no medication side effects. The current treatment provides mild improvement. There are no compliance problems. Hypertensive end-organ damage includes CAD/MI. Hyperlipidemia  This is a chronic problem. The current episode started more than 1 year ago. The problem is controlled. Recent lipid tests were reviewed and are variable.  There are no known factors aggravating her hyperlipidemia. Pertinent negatives include no myalgias or shortness of breath. Treatments tried: taking medications, no medication side effects. The current treatment provides mild improvement of lipids. There are no compliance problems. Risk factors for coronary artery disease include diabetes mellitus, dyslipidemia and hypertension. Diabetes  She presents for her follow-up diabetic visit. She has type 2 diabetes mellitus. Her disease course has been stable. There are no hypoglycemic associated symptoms. Pertinent negatives for hypoglycemia include no confusion or nervousness/anxiousness. There are no diabetic associated symptoms. There are no hypoglycemic complications. There are no diabetic complications. Risk factors for coronary artery disease include diabetes mellitus, dyslipidemia and hypertension. Current diabetic treatments: taking medications, no medication side effects. She is compliant with treatment most of the time. Her weight is fluctuating minimally. She is following a generally healthy diet. Her home blood glucose trend is fluctuating minimally. ROS:  Review of Systems   Constitutional: Negative. HENT:  Negative for congestion, ear discharge, ear pain, mouth sores, nosebleeds, postnasal drip, rhinorrhea, sinus pressure, sinus pain, sneezing, sore throat, trouble swallowing and voice change. Eyes:  Negative for discharge, redness, itching and visual disturbance. Denies diplopia, recent change in visual acuity, blurred vision, and night vision loss. Does not wear corrective lenses. Respiratory:  Negative for cough, shortness of breath and wheezing. Denies asthma, COPD, hemoptysis   Cardiovascular: Negative. Gastrointestinal: Negative. Endocrine: Negative. Genitourinary:  Negative for difficulty urinating and urgency. Denies hesitancy, incomplete voiding, and polyuria   Musculoskeletal:  Negative for myalgias.         Denies joint pain Skin: Negative. Hematological: Negative. Psychiatric/Behavioral:  Negative for confusion and suicidal ideas. The patient is not nervous/anxious. Denies difficulty concentrating, depression, flight of ideas, slow thought processes, suicide attempts      Current Meds: Refer to nursing home record    PMH:    Medical Problems:       Diagnosis Date    Arthritis     right knee    Diabetes mellitus ([address]     Hyperthyroidism         Surgical Hx:   Past Surgical History:   Procedure Laterality Date    CATARACT REMOVAL WITH IMPLANT  2008    HIP SURGERY Left 8/4/2022    LEFT HIP HEMIARTHROPLASTY (DEPUY) performed by Javier Zhao DO at [address] [facility] ARTHROSCOPY  2005    right    TONSILLECTOMY          FH:      Problem Relation Age of Onset    Heart Failure Mother     Heart Failure Father         SH:    reports that she has never smoked. She has never used smokeless tobacco. She reports current alcohol use. She reports that she does not use drugs. Objective: Wt: 137.2 BP: 154/76 Pulse: 70 Resp: 18 T: 97.9F     Physical Exam:  Physical Exam  Vitals reviewed. Constitutional:       General: She is not in acute distress. Appearance: Normal appearance. She is normal weight. She is not ill-appearing or toxic-appearing. Comments: Appears appropriate for age   HENT:      Head: Normocephalic and atraumatic. Right Ear: Tympanic membrane and external ear normal.      Left Ear: Tympanic membrane and external ear normal.      Ears:      Comments: Middle ear well aerated. Nose: Nose normal.      Mouth/Throat:      Mouth: Mucous membranes are moist.      Dentition: Normal dentition. No gingival swelling or dental caries. Pharynx: Oropharynx is clear. Uvula midline. Comments: Gums appear healthy. No thrush no mucositis  Eyes:      General: Vision grossly intact. Extraocular Movements: Extraocular movements intact.       Conjunctiva/sclera: Conjunctivae normal.      Pupils: Pupils are equal, round, and reactive to light.      Comments: Fundoscopic exam is benign  Retinal vessels: Normal   Neck:      Vascular: No carotid bruit.      Comments: Thyroid is normal in size.  Carotids 2+ and equal bilaterally  Cardiovascular:      Rate and Rhythm: Normal rate and regular rhythm.      Pulses: Normal pulses.      Heart sounds: Normal heart sounds, S1 normal and S2 normal. No murmur heard.    No friction rub. No gallop.      Comments: Pedal pulses 2+ and equal bilaterally  Pulmonary:      Effort: Pulmonary effort is normal.      Breath sounds: Normal breath sounds.   Abdominal:      General: Bowel sounds are normal. There is no distension.      Palpations: Abdomen is soft. There is no mass.      Tenderness: There is no abdominal tenderness. There is no guarding or rebound.      Hernia: No hernia is present.      Comments: No rigidity   Musculoskeletal:         General: Normal range of motion.      Right shoulder: Normal.      Left shoulder: Normal.      Right upper arm: Normal.      Left upper arm: Normal.      Cervical back: Normal range of motion.      Right hip: Normal.      Left hip: Normal.      Right upper leg: Normal.      Left upper leg: Normal.      Right lower leg: Normal.      Left lower leg: Normal.      Right foot: Normal.      Left foot: Normal.      Comments: Generalized weakness    Lymphadenopathy:      Comments: No palpable or visible regional lymphadenopathy   Skin:     General: Skin is warm and dry.      Findings: No bruising, ecchymosis, lesion or rash.   Neurological:      General: No focal deficit present.      Mental Status: She is alert. Mental status is at baseline.      Cranial Nerves: No cranial nerve deficit.      Deep Tendon Reflexes: Reflexes normal.   Psychiatric:         Mood and Affect: Mood and affect normal. Mood is not depressed.         Behavior: Behavior normal. Behavior is not agitated.      Comments: No apparent anxiety       Assessment & Plan:  1.  Weakness  2. Atherosclerosis of native coronary artery without angina pectoris, unspecified whether native or transplanted heart  3. Chronic atrial fibrillation ([facility]  4. Primary hypertension  5. Mixed hyperlipidemia  6.  Type 2 diabetes mellitus without complication, unspecified whether long term insulin use ([facility]     Chart reviewed   Medications reviewed   She is going to be discharged home 11/29/22 when wheelchair and walker are available she is not steady  Continue current treatment      I, Tanya Sanderson MA  am scribing for MATTEO Womack MD, personally performed the services described in this documentation as scribed by Tanya Sanderson and it is both accurate and complete

## 2023-01-11 ENCOUNTER — TELEPHONE (OUTPATIENT)
Dept: PRIMARY CARE CLINIC | Age: 88
End: 2023-01-11

## 2023-01-11 NOTE — TELEPHONE ENCOUNTER
Jh Hammonds at [facility] called stating she went to pts home for eval. Metoprolol was reduced (by dr Paul Marie) from 25mg to 12.5mg  Pts HR was 42 -- still in AFIB  Dizzy upon standing  BP stable  Any questions, call Jh Hammonds at [phone]

## 2023-01-26 ENCOUNTER — TELEPHONE (OUTPATIENT)
Dept: PRIMARY CARE CLINIC | Age: 88
End: 2023-01-26

## 2023-01-26 NOTE — TELEPHONE ENCOUNTER
[facility] called about her heart rate and blood pressure being low again and her metoprolol had been held previously for one week and improved metoprolol was resumed at 12.5 and hr was 42 today and bp was low did advise them to hold the medication until I speak to you [id number] [facility] number is [phone]

## 2023-01-31 LAB
LEFT VENTRICULAR EJECTION FRACTION MODE: NORMAL
LV EF: NORMAL %

## (undated) DEVICE — COVER,TABLE,60X90,STERILE: Brand: MEDLINE

## (undated) DEVICE — SUTURE STRATAFIX SYMMETRIC SZ 1 L18IN ABSRB VLT CT1 L36CM SXPP1A404

## (undated) DEVICE — GAUZE,SPONGE,4"X4",16PLY,STRL,LF,10/TRAY: Brand: MEDLINE

## (undated) DEVICE — GOWN,SIRUS,FABRNF,XL,20/CS: Brand: MEDLINE

## (undated) DEVICE — TOWEL,OR,DSP,ST,BLUE,STD,6/PK,12PK/CS: Brand: MEDLINE

## (undated) DEVICE — STOCKINETTE,IMPERVIOUS,12X48,STERILE: Brand: MEDLINE

## (undated) DEVICE — BANDAGE COMPR W6INXL5YD SELF ADH COHESIVE CO FLX

## (undated) DEVICE — 3M™ STERI-DRAPE™ U-DRAPE 1015: Brand: STERI-DRAPE™

## (undated) DEVICE — DRESSING PETRO W3XL8IN N ADH OIL EMUL GZ CURAD

## (undated) DEVICE — ELECTRODE PT RET AD L9FT HI MOIST COND ADH HYDRGEL CORDED

## (undated) DEVICE — SYRINGE MED 30ML STD CLR PLAS LUERLOCK TIP N CTRL DISP

## (undated) DEVICE — INTENDED FOR TISSUE SEPARATION, AND OTHER PROCEDURES THAT REQUIRE A SHARP SURGICAL BLADE TO PUNCTURE OR CUT.: Brand: BARD-PARKER ® STAINLESS STEEL BLADES

## (undated) DEVICE — 3M™ IOBAN™ 2 ANTIMICROBIAL INCISE DRAPE 6651EZ: Brand: IOBAN™ 2

## (undated) DEVICE — COVER,LIGHT HANDLE,FLX,1/PK: Brand: MEDLINE INDUSTRIES, INC.

## (undated) DEVICE — SYRINGE IRRIG 60ML SFT PLIABLE BLB EZ TO GRP 1 HND USE W/

## (undated) DEVICE — TUBING, SUCTION, 1/4" X 10', STRAIGHT: Brand: MEDLINE

## (undated) DEVICE — BLADE ES L6IN ELASTOMERIC COAT EXT DURABLE BEND UPTO 90DEG

## (undated) DEVICE — GOWN,SIRUS,POLYRNF,BRTHSLV,XLN/XL,20/CS: Brand: MEDLINE

## (undated) DEVICE — MARKER,SKIN,WI/RULER AND LABELS: Brand: MEDLINE

## (undated) DEVICE — YANKAUER,BULB TIP,W/O VENT,RIGID,STERILE: Brand: MEDLINE

## (undated) DEVICE — DOUBLE BASIN SET: Brand: MEDLINE INDUSTRIES, INC.

## (undated) DEVICE — Device

## (undated) DEVICE — 1016 S-DRAPE IRRIG POUCH 10/BOX: Brand: STERI-DRAPE™

## (undated) DEVICE — Z DISCONTINUED USE 2272124 DRAPE SURG XL N INVASIVE 2 LAYR DISP

## (undated) DEVICE — HANDPIECE SET WITH BONE CLEANING TIP: Brand: INTERPULSE

## (undated) DEVICE — SET MAJOR INSTR ORTHO

## (undated) DEVICE — 2108 SERIES SAGITTAL BLADE (24.8 X 0.88 X 90.5MM)

## (undated) DEVICE — APPLICATOR MEDICATED 26 CC SOLUTION HI LT ORNG CHLORAPREP

## (undated) DEVICE — Z DISCONTINUED USE 2275686 GLOVE SURG SZ 8 L12IN FNGR THK13MIL WHT ISOLEX POLYISOPRENE

## (undated) DEVICE — SPONGE LAP W18XL18IN WHT COT 4 PLY FLD STRUNG RADPQ DISP ST

## (undated) DEVICE — CANNULA IV 18GA L15IN BLNT FILL LUERLOCK HUB MJCT

## (undated) DEVICE — SURGICAL PROCEDURE PACK ORTH IV ECLIPSE STRL

## (undated) DEVICE — NDL CNTR 40CT FM MAG: Brand: MEDLINE INDUSTRIES, INC.

## (undated) DEVICE — PENCIL ES L3M BTTN SWCH HOLSTER W/ BLDE ELECTRD EDGE